# Patient Record
Sex: FEMALE | Race: WHITE | NOT HISPANIC OR LATINO | Employment: OTHER | URBAN - NONMETROPOLITAN AREA
[De-identification: names, ages, dates, MRNs, and addresses within clinical notes are randomized per-mention and may not be internally consistent; named-entity substitution may affect disease eponyms.]

---

## 2017-01-01 ENCOUNTER — HOSPITAL ENCOUNTER (OUTPATIENT)
Dept: WOUND CARE | Facility: HOSPITAL | Age: 82
Setting detail: RECURRING SERIES
Discharge: HOME | End: 2017-01-20
Attending: THORACIC SURGERY (CARDIOTHORACIC VASCULAR SURGERY) | Admitting: THORACIC SURGERY (CARDIOTHORACIC VASCULAR SURGERY)

## 2017-01-06 ENCOUNTER — OFFICE VISIT (OUTPATIENT)
Dept: FAMILY MEDICINE CLINIC | Facility: CLINIC | Age: 82
End: 2017-01-06

## 2017-01-06 VITALS
HEART RATE: 64 BPM | OXYGEN SATURATION: 93 % | TEMPERATURE: 97.3 F | DIASTOLIC BLOOD PRESSURE: 58 MMHG | WEIGHT: 185 LBS | HEIGHT: 64 IN | BODY MASS INDEX: 31.58 KG/M2 | SYSTOLIC BLOOD PRESSURE: 116 MMHG

## 2017-01-06 DIAGNOSIS — J44.9 CHRONIC OBSTRUCTIVE PULMONARY DISEASE, UNSPECIFIED COPD TYPE (HCC): Primary | ICD-10-CM

## 2017-01-06 PROCEDURE — 99212 OFFICE O/P EST SF 10 MIN: CPT | Performed by: FAMILY MEDICINE

## 2017-01-06 PROCEDURE — 96372 THER/PROPH/DIAG INJ SC/IM: CPT | Performed by: FAMILY MEDICINE

## 2017-01-06 RX ORDER — GUAIFENESIN AND DEXTROMETHORPHAN HYDROBROMIDE 600; 30 MG/1; MG/1
1 TABLET, EXTENDED RELEASE ORAL 2 TIMES DAILY PRN
Qty: 14 TABLET | Refills: 0 | Status: SHIPPED | OUTPATIENT
Start: 2017-01-06 | End: 2017-01-12

## 2017-01-06 RX ORDER — TRIAMCINOLONE ACETONIDE 40 MG/ML
80 INJECTION, SUSPENSION INTRA-ARTICULAR; INTRAMUSCULAR ONCE
Status: COMPLETED | OUTPATIENT
Start: 2017-01-06 | End: 2017-01-06

## 2017-01-06 RX ADMIN — TRIAMCINOLONE ACETONIDE 80 MG: 40 INJECTION, SUSPENSION INTRA-ARTICULAR; INTRAMUSCULAR at 11:05

## 2017-01-06 NOTE — MR AVS SNAPSHOT
Gavi Gordon   1/6/2017 10:15 AM   Office Visit    Dept Phone:  [phone]   Encounter #:  [id number]    Provider:  May Martin MD   Department:  [facility]                Your Full Care Plan              Today's Medication Changes          These changes are accurate as of: 1/6/17 11:35 AM.  If you have any questions, ask your nurse or doctor.               New Medication(s)Ordered:     guaifenesin-dextromethorphan  MG tablet sustained-release 12 hour tablet   Take 1 tablet by mouth 2 (Two) Times a Day As Needed (cough, congestion).   Started by:  May Martin MD            Where to Get Your Medications      These medications were sent to [facility] - [place], [place] - [address] [place] [address] - [phone]  - [phone] [address] [address]     Phone:  [phone]     guaifenesin-dextromethorphan  MG tablet sustained-release 12 hour tablet                  Your Updated Medication List          This list is accurate as of: 1/6/17 11:35 AM.  Always use your most recent med list.                * albuterol (2.5 MG/3ML) 0.083% nebulizer solution   Commonly known as:  PROVENTIL       * PROAIR RESPICLICK 108 (90 BASE) MCG/ACT inhaler   Generic drug:  albuterol       alendronate 70 MG tablet   Commonly known as:  FOSAMAX   Take 1 tablet by mouth 1 (One) Time Per Week.       atorvastatin 80 MG tablet   Commonly known as:  LIPITOR       bacitracin 500 UNIT/GM ointment   Apply  topically 2 (Two) Times a Day.       busPIRone 10 MG tablet   Commonly known as:  BUSPAR   TAKE 1 TABLET TWICE A DAY AS NEEDED       Calcium Carb-Cholecalciferol 600-800 MG-UNIT tablet       celecoxib 200 MG capsule   Commonly known as:  CeleBREX       CENTRUM PO       cetirizine 10 MG tablet   Commonly known as:  zyrTEC       clopidogrel 75 MG tablet   Commonly known as:  PLAVIX       diphenoxylate-atropine  2.5-0.025 MG per tablet   Commonly known as:  LOMOTIL   1 TABLET BY MOUTH 2 TIMES A DAY       donepezil 10 MG tablet   Commonly known as:  ARICEPT       fluticasone-salmeterol 500-50 MCG/DOSE DISKUS   Commonly known as:  ADVAIR       furosemide 40 MG tablet   Commonly known as:  LASIX       guaifenesin-dextromethorphan  MG tablet sustained-release 12 hour tablet   Take 1 tablet by mouth 2 (Two) Times a Day As Needed (cough, congestion).       isosorbide mononitrate 60 MG 24 hr tablet   Commonly known as:  IMDUR       levothyroxine 50 MCG tablet   Commonly known as:  SYNTHROID, LEVOTHROID       LORazepam 0.5 MG tablet   Commonly known as:  ATIVAN   Take 1 tablet by mouth Every 8 (Eight) Hours As Needed for anxiety.       memantine 28 MG capsule sustained-release 24 hr extended release capsule   Commonly known as:  NAMENDA XR   Take 1 capsule by mouth daily.       nystatin 051729 UNIT/ML suspension   Commonly known as:  MYCOSTATIN       OLUX 0.05 % topical foam   Generic drug:  clobetasol       omeprazole 20 MG capsule   Commonly known as:  priLOSEC   Take 1 capsule by mouth 2 (Two) Times a Day.       potassium chloride 10 MEQ CR tablet   Commonly known as:  K-DUR       spironolactone 50 MG tablet   Commonly known as:  ALDACTONE       triamcinolone 0.1 % cream   Commonly known as:  KENALOG       venlafaxine XR 75 MG 24 hr capsule   Commonly known as:  EFFEXOR-XR       VITAMIN B-12 ER PO       * Notice:  This list has 2 medication(s) that are the same as other medications prescribed for you. Read the directions carefully, and ask your doctor or other care provider to review them with you.            You Were Diagnosed With        Codes Comments    Chronic obstructive pulmonary disease, unspecified COPD type    -  Primary ICD-10-CM: J44.9  ICD-9-CM: 496       Medications to be Given to You by a Medical Professional     Due       Frequency    9/7/2016 ipratropium-albuterol (DUO-NEB) nebulizer solution 3 mL  4 Times  "Daily - RT    (none) triamcinolone acetonide (KENALOG-40) injection 80 mg  Once      Instructions     None    Patient Instructions History      Upcoming Appointments     Visit Type Date Time Department    OFFICE VISIT 1/6/2017 10:15 AM [facility]      MyChart Signup     Our records indicate that you have declined [email or web address] signup. If you would like to sign up for [employer], please email [email or web address] or call [phone] to obtain an activation code.             Other Info from Your Visit           Allergies     Ceclor [Cefaclor]        Reason for Visit     Nasal Congestion cough, sneezing, started last week      Vital Signs     Blood Pressure Pulse Temperature Height Weight Oxygen Saturation    116/58 64 97.3 °F (36.3 °C) 64\" (162.6 cm) 185 lb (83.9 kg) 93%    Body Mass Index Smoking Status                31.76 kg/m2 Former Smoker          Problems and Diagnoses Noted     Chronic airway obstruction    -  Primary      Medications Administered     triamcinolone acetonide (KENALOG-40) injection 80 mg                      "

## 2017-01-06 NOTE — PROGRESS NOTES
Subjective   Chief Complaint   Patient presents with   • Nasal Congestion     cough, sneezing, started last week     Gavi Gordon is a 82 y.o. female.   Nasal Congestion (cough, sneezing, started last week)    Sinusitis   This is a new problem. The current episode started 1 to 4 weeks ago. The problem has been gradually worsening since onset. There has been no fever. Her pain is at a severity of 3/10. The pain is mild. Associated symptoms include congestion, ear pain, headaches, shortness of breath, sneezing and a sore throat. Pertinent negatives include no chills, coughing, neck pain or sinus pressure. Past treatments include acetaminophen. The treatment provided mild relief.   history of copd - uses home oxygen at 2 LPM    Recent death in the family - her sister passed away  She is tearful in the office  Her  was a week ago, she passed away sudden from sepsis related to obstructed nephrolithiasis  Patient states that she is doing ok with the death and has great family support     The following portions of the patient's history were reviewed and updated as appropriate: allergies, current medications, past family history, past medical history, past social history, past surgical history and problem list.    Review of Systems   Constitutional: Negative for appetite change, chills, fatigue and fever.   HENT: Positive for congestion, ear pain, sneezing and sore throat. Negative for rhinorrhea and sinus pressure.    Eyes: Negative for pain.   Respiratory: Positive for shortness of breath. Negative for cough.    Cardiovascular: Negative for chest pain and palpitations.   Gastrointestinal: Negative for abdominal pain, constipation, diarrhea and nausea.   Genitourinary: Negative for dysuria.   Musculoskeletal: Negative for back pain, joint swelling and neck pain.   Skin: Negative for rash.   Neurological: Positive for headaches. Negative for dizziness.       Objective   Visit Vitals   • /58   • Pulse 64   •  "Temp 97.3 °F (36.3 °C)   • Ht 64\" (162.6 cm)   • Wt 185 lb (83.9 kg)   • SpO2 93%  Comment: sitting, room air   • BMI 31.76 kg/m2     Physical Exam   Constitutional: She is oriented to person, place, and time. She appears well-developed and well-nourished.   HENT:   Head: Normocephalic and atraumatic.   Right Ear: External ear normal.   Left Ear: External ear normal.   Nose: Nose normal.   Posterior rhinorrhea noted on examination   Eyes: Pupils are equal, round, and reactive to light.   Neck: Normal range of motion. Neck supple.   Cardiovascular: Normal rate, regular rhythm and normal heart sounds.    Pulmonary/Chest: No respiratory distress. She has decreased breath sounds. She has no wheezes. She has no rales.   Patient is not with portable oxygen   Abdominal: Soft. Bowel sounds are normal.   Musculoskeletal: Normal range of motion.   Ambulating with cane   Neurological: She is alert and oriented to person, place, and time.   Skin: Skin is warm and dry.   Skin tear of the right upper extremity is healing well   Tissue has healthy appearance and pink color, no signs of infection     Psychiatric: She has a normal mood and affect.   Nursing note and vitals reviewed.      Assessment/Plan   Problems Addressed this Visit        Respiratory    Chronic obstructive pulmonary disease - Primary    Relevant Medications    triamcinolone acetonide (KENALOG-40) injection 80 mg (Completed)    guaifenesin-dextromethorphan (MUCINEX DM)  MG tablet sustained-release 12 hour tablet        Start mucinex Dm - for cough and congestion    Kenalog 80mg IM today    Recommend oxygen use at home  Less activity   If symptoms worsen call the office, consider abx treatment if necessary    Recheck as needed           "

## 2017-01-10 RX ORDER — LORAZEPAM 0.5 MG/1
0.5 TABLET ORAL EVERY 8 HOURS PRN
Qty: 90 TABLET | Refills: 2 | Status: SHIPPED | OUTPATIENT
Start: 2017-01-10 | End: 2017-02-24 | Stop reason: SDUPTHER

## 2017-01-12 ENCOUNTER — OFFICE VISIT (OUTPATIENT)
Dept: FAMILY MEDICINE CLINIC | Facility: CLINIC | Age: 82
End: 2017-01-12

## 2017-01-12 VITALS
HEART RATE: 69 BPM | BODY MASS INDEX: 33.29 KG/M2 | DIASTOLIC BLOOD PRESSURE: 60 MMHG | TEMPERATURE: 97.9 F | HEIGHT: 64 IN | SYSTOLIC BLOOD PRESSURE: 122 MMHG | WEIGHT: 195 LBS

## 2017-01-12 DIAGNOSIS — J01.20 ACUTE NON-RECURRENT ETHMOIDAL SINUSITIS: ICD-10-CM

## 2017-01-12 DIAGNOSIS — J44.9 CHRONIC OBSTRUCTIVE PULMONARY DISEASE, UNSPECIFIED COPD TYPE (HCC): Primary | ICD-10-CM

## 2017-01-12 PROCEDURE — 99212 OFFICE O/P EST SF 10 MIN: CPT | Performed by: FAMILY MEDICINE

## 2017-01-12 RX ORDER — LEVOFLOXACIN 500 MG/1
500 TABLET, FILM COATED ORAL DAILY
Qty: 10 TABLET | Refills: 0 | Status: SHIPPED | OUTPATIENT
Start: 2017-01-12 | End: 2017-01-22

## 2017-01-12 RX ORDER — FLUTICASONE PROPIONATE 50 MCG
2 SPRAY, SUSPENSION (ML) NASAL DAILY
Qty: 1 EACH | Refills: 0 | Status: SHIPPED | OUTPATIENT
Start: 2017-01-12 | End: 2017-01-12 | Stop reason: SDUPTHER

## 2017-01-12 RX ORDER — FLUTICASONE PROPIONATE 50 MCG
2 SPRAY, SUSPENSION (ML) NASAL DAILY
Qty: 1 EACH | Refills: 0 | Status: SHIPPED | OUTPATIENT
Start: 2017-01-12 | End: 2017-03-16 | Stop reason: SDUPTHER

## 2017-01-12 RX ORDER — LEVOFLOXACIN 500 MG/1
500 TABLET, FILM COATED ORAL DAILY
Qty: 10 TABLET | Refills: 0 | Status: SHIPPED | OUTPATIENT
Start: 2017-01-12 | End: 2017-01-12 | Stop reason: SDUPTHER

## 2017-01-12 NOTE — PROGRESS NOTES
"Subjective   Chief Complaint   Patient presents with   • Illness     not any better     Gavi Gordon is a 82 y.o. female.   Illness (not any better)    History of Present Illness     Sinusitis   This is a new problem. The current episode started 1 to 4 weeks ago. The problem has been gradually worsening since onset. There has been no fever. Her pain is at a severity of 3/10. The pain is mild. Associated symptoms include congestion, ear pain, headaches, shortness of breath, sneezing, sinus pressure, neck and shoulder pain, and a sore throat. Pertinent negatives include no chills, coughing. Past treatments include acetaminophen. The treatment provided mild relief.   Recently failed improvement with mucinex DM and steroid injection  history of copd - uses home oxygen at 2 LPM    The following portions of the patient's history were reviewed and updated as appropriate: allergies, current medications, past family history, past medical history, past social history, past surgical history and problem list.    Review of Systems   Constitutional: Negative for appetite change, chills, fatigue and fever.   HENT: Positive for rhinorrhea, sinus pressure, sneezing and sore throat. Negative for congestion and ear pain.    Eyes: Negative for pain.   Respiratory: Positive for shortness of breath. Negative for cough.    Cardiovascular: Negative for chest pain and palpitations.   Gastrointestinal: Negative for abdominal pain, constipation, diarrhea and nausea.   Genitourinary: Negative for dysuria.   Musculoskeletal: Positive for arthralgias, myalgias and neck pain. Negative for back pain and joint swelling.   Skin: Negative for rash.   Neurological: Positive for headaches. Negative for dizziness.       Objective   Visit Vitals   • /60   • Pulse 69   • Temp 97.9 °F (36.6 °C)   • Ht 64\" (162.6 cm)   • Wt 195 lb (88.5 kg)   • BMI 33.47 kg/m2     Physical Exam   Constitutional: She is oriented to person, place, and time. She " appears well-developed and well-nourished.   HENT:   Head: Normocephalic and atraumatic.   Right Ear: External ear normal.   Left Ear: External ear normal.   Nose: Sinus tenderness present. Right sinus exhibits maxillary sinus tenderness. Right sinus exhibits no frontal sinus tenderness. Left sinus exhibits maxillary sinus tenderness. Left sinus exhibits no frontal sinus tenderness.   Mouth/Throat: Oropharynx is clear and moist.   Ethmoidal sinus tenderness   Eyes: Pupils are equal, round, and reactive to light.   Neck: Normal range of motion. Neck supple.   Cardiovascular: Normal rate, regular rhythm and normal heart sounds.    Pulmonary/Chest: Breath sounds normal. No respiratory distress. She has no wheezes. She has no rales.   Neurological: She is alert and oriented to person, place, and time.   Skin: Skin is warm and dry.   Psychiatric: She has a normal mood and affect.   Nursing note and vitals reviewed.      Assessment/Plan   Problems Addressed this Visit        Respiratory    Chronic obstructive pulmonary disease - Primary    Relevant Medications    levoFLOXacin (LEVAQUIN) 500 MG tablet    fluticasone (FLONASE) 50 MCG/ACT nasal spray      Other Visit Diagnoses     Acute non-recurrent ethmoidal sinusitis        Relevant Medications    fluticasone (FLONASE) 50 MCG/ACT nasal spray        Failed steroid injection and mucinex DM  Start levaquin  Start flonase  Recheck as needed

## 2017-01-12 NOTE — MR AVS SNAPSHOT
Gavi Gordon   1/12/2017 11:45 AM   Office Visit    Dept Phone:  [phone]   Encounter #:  [id number]    Provider:  May Martin MD   Department:  [facility]                Your Full Care Plan              Today's Medication Changes          These changes are accurate as of: 1/12/17 12:16 PM.  If you have any questions, ask your nurse or doctor.               New Medication(s)Ordered:     fluticasone 50 MCG/ACT nasal spray   Commonly known as:  FLONASE   2 sprays into each nostril Daily.   Started by:  May Martin MD       levoFLOXacin 500 MG tablet   Commonly known as:  LEVAQUIN   Take 1 tablet by mouth Daily for 10 days.   Started by:  May Martin MD         Stop taking medication(s)listed here:     guaifenesin-dextromethorphan  MG tablet sustained-release 12 hour tablet   Stopped by:  May Martin MD                Where to Get Your Medications      These medications were sent to [facility] [place] [id number] - [phone] [place] [phone] [facility] [facility] [address]     Phone:  [phone]     fluticasone 50 MCG/ACT nasal spray    levoFLOXacin 500 MG tablet                  Your Updated Medication List          This list is accurate as of: 1/12/17 12:16 PM.  Always use your most recent med list.                * albuterol (2.5 MG/3ML) 0.083% nebulizer solution   Commonly known as:  PROVENTIL       * PROAIR RESPICLICK 108 (90 BASE) MCG/ACT inhaler   Generic drug:  albuterol       alendronate 70 MG tablet   Commonly known as:  FOSAMAX   Take 1 tablet by mouth 1 (One) Time Per Week.       atorvastatin 80 MG tablet   Commonly known as:  LIPITOR       bacitracin 500 UNIT/GM ointment   Apply  topically 2 (Two) Times a Day.       busPIRone 10 MG tablet   Commonly known as:  BUSPAR   TAKE 1 TABLET TWICE A DAY AS NEEDED       Calcium Carb-Cholecalciferol 600-800  MG-UNIT tablet       celecoxib 200 MG capsule   Commonly known as:  CeleBREX       CENTRUM PO       cetirizine 10 MG tablet   Commonly known as:  zyrTEC       clopidogrel 75 MG tablet   Commonly known as:  PLAVIX       diphenoxylate-atropine 2.5-0.025 MG per tablet   Commonly known as:  LOMOTIL   1 TABLET BY MOUTH 2 TIMES A DAY       donepezil 10 MG tablet   Commonly known as:  ARICEPT       fluticasone 50 MCG/ACT nasal spray   Commonly known as:  FLONASE   2 sprays into each nostril Daily.       fluticasone-salmeterol 500-50 MCG/DOSE DISKUS   Commonly known as:  ADVAIR       furosemide 40 MG tablet   Commonly known as:  LASIX       isosorbide mononitrate 60 MG 24 hr tablet   Commonly known as:  IMDUR       levoFLOXacin 500 MG tablet   Commonly known as:  LEVAQUIN   Take 1 tablet by mouth Daily for 10 days.       levothyroxine 50 MCG tablet   Commonly known as:  SYNTHROID, LEVOTHROID       LORazepam 0.5 MG tablet   Commonly known as:  ATIVAN   Take 1 tablet by mouth Every 8 (Eight) Hours As Needed for anxiety.       memantine 28 MG capsule sustained-release 24 hr extended release capsule   Commonly known as:  NAMENDA XR   Take 1 capsule by mouth daily.       nystatin 150006 UNIT/ML suspension   Commonly known as:  MYCOSTATIN       OLUX 0.05 % topical foam   Generic drug:  clobetasol       omeprazole 20 MG capsule   Commonly known as:  priLOSEC   Take 1 capsule by mouth 2 (Two) Times a Day.       potassium chloride 10 MEQ CR tablet   Commonly known as:  K-DUR       spironolactone 50 MG tablet   Commonly known as:  ALDACTONE       triamcinolone 0.1 % cream   Commonly known as:  KENALOG       venlafaxine XR 75 MG 24 hr capsule   Commonly known as:  EFFEXOR-XR       VITAMIN B-12 ER PO       * Notice:  This list has 2 medication(s) that are the same as other medications prescribed for you. Read the directions carefully, and ask your doctor or other care provider to review them with you.            You Were Diagnosed With  "       Codes Comments    Chronic obstructive pulmonary disease, unspecified COPD type    -  Primary ICD-10-CM: J44.9  ICD-9-CM: 496     Acute non-recurrent ethmoidal sinusitis     ICD-10-CM: J01.20  ICD-9-CM: 461.2       Medications to be Given to You by a Medical Professional     Due       Frequency    9/7/2016 ipratropium-albuterol (DUO-NEB) nebulizer solution 3 mL  4 Times Daily - RT      Instructions     None    Patient Instructions History      Upcoming Appointments     Visit Type Date Time Department    OFFICE VISIT 1/12/2017 11:45 AM [facility]      [employer] Signup     Our records indicate that your [employer] account has been deactivated. If you would like to reactivate your account, please email [email or web address] or call [phone] to talk to our [employer] staff.             Other Info from Your Visit           Allergies     Ceclor [Cefaclor]        Reason for Visit     Illness not any better      Vital Signs     Blood Pressure Pulse Temperature Height Weight Body Mass Index    122/60 69 97.9 °F (36.6 °C) 64\" (162.6 cm) 195 lb (88.5 kg) 33.47 kg/m2    Smoking Status                   Former Smoker           Problems and Diagnoses Noted     Chronic airway obstruction    Acute non-recurrent ethmoidal sinusitis          No Longer an Issue     Infected skin tear    Skin tear of forearm without complication        "

## 2017-01-23 ENCOUNTER — LAB (OUTPATIENT)
Dept: LAB | Facility: OTHER | Age: 82
End: 2017-01-23

## 2017-01-23 ENCOUNTER — OFFICE VISIT (OUTPATIENT)
Dept: FAMILY MEDICINE CLINIC | Facility: CLINIC | Age: 82
End: 2017-01-23

## 2017-01-23 VITALS
DIASTOLIC BLOOD PRESSURE: 78 MMHG | WEIGHT: 183 LBS | SYSTOLIC BLOOD PRESSURE: 128 MMHG | BODY MASS INDEX: 31.24 KG/M2 | TEMPERATURE: 96.6 F | HEIGHT: 64 IN | HEART RATE: 79 BPM

## 2017-01-23 DIAGNOSIS — M43.10 ANTEROLISTHESIS: ICD-10-CM

## 2017-01-23 DIAGNOSIS — M79.10 MYALGIA: ICD-10-CM

## 2017-01-23 DIAGNOSIS — B37.0 ORAL THRUSH: Primary | ICD-10-CM

## 2017-01-23 DIAGNOSIS — E53.8 B12 DEFICIENCY: ICD-10-CM

## 2017-01-23 LAB
ANION GAP SERPL CALCULATED.3IONS-SCNC: 8 MMOL/L (ref 5–15)
BUN BLD-MCNC: 26 MG/DL (ref 8–25)
BUN/CREAT SERPL: 28.9 (ref 7–25)
CALCIUM SPEC-SCNC: 9.3 MG/DL (ref 8.4–10.8)
CHLORIDE SERPL-SCNC: 98 MMOL/L (ref 100–112)
CO2 SERPL-SCNC: 34 MMOL/L (ref 20–32)
CREAT BLD-MCNC: 0.9 MG/DL (ref 0.4–1.3)
DEPRECATED RDW RBC AUTO: 53.2 FL (ref 36.4–46.3)
EOSINOPHIL # BLD MANUAL: 0.66 10*3/MM3 (ref 0–0.7)
EOSINOPHIL NFR BLD MANUAL: 7 % (ref 0–7)
ERYTHROCYTE [DISTWIDTH] IN BLOOD BY AUTOMATED COUNT: 15 % (ref 11.5–14.5)
GFR SERPL CREATININE-BSD FRML MDRD: 60 ML/MIN/1.73 (ref 39–90)
GLUCOSE BLD-MCNC: 103 MG/DL (ref 70–100)
HCT VFR BLD AUTO: 40.1 % (ref 35–45)
HGB BLD-MCNC: 12.6 G/DL (ref 12–15.5)
LYMPHOCYTES # BLD MANUAL: 1.79 10*3/MM3 (ref 0.6–4.2)
LYMPHOCYTES NFR BLD MANUAL: 10 % (ref 0–12)
LYMPHOCYTES NFR BLD MANUAL: 19 % (ref 10–50)
MAGNESIUM SERPL-MCNC: 2.1 MG/DL (ref 1.8–2.5)
MCH RBC QN AUTO: 31.1 PG (ref 26.5–34)
MCHC RBC AUTO-ENTMCNC: 31.4 G/DL (ref 31.4–36)
MCV RBC AUTO: 99 FL (ref 80–98)
MONOCYTES # BLD AUTO: 0.94 10*3/MM3 (ref 0–0.9)
NEUTROPHILS # BLD AUTO: 6.04 10*3/MM3 (ref 2–8.6)
NEUTROPHILS NFR BLD MANUAL: 64 % (ref 37–80)
PLAT MORPH BLD: NORMAL
PLATELET # BLD AUTO: 215 10*3/MM3 (ref 150–450)
PMV BLD AUTO: 9.6 FL (ref 8–12)
POTASSIUM BLD-SCNC: 4.4 MMOL/L (ref 3.4–5.4)
RBC # BLD AUTO: 4.05 10*6/MM3 (ref 3.77–5.16)
RBC MORPH BLD: NORMAL
SODIUM BLD-SCNC: 140 MMOL/L (ref 134–146)
WBC MORPH BLD: NORMAL
WBC NRBC COR # BLD: 9.44 10*3/MM3 (ref 3.2–9.8)

## 2017-01-23 PROCEDURE — 80048 BASIC METABOLIC PNL TOTAL CA: CPT | Performed by: FAMILY MEDICINE

## 2017-01-23 PROCEDURE — 82607 VITAMIN B-12: CPT | Performed by: FAMILY MEDICINE

## 2017-01-23 PROCEDURE — 82746 ASSAY OF FOLIC ACID SERUM: CPT | Performed by: FAMILY MEDICINE

## 2017-01-23 PROCEDURE — 83735 ASSAY OF MAGNESIUM: CPT | Performed by: FAMILY MEDICINE

## 2017-01-23 PROCEDURE — 85025 COMPLETE CBC W/AUTO DIFF WBC: CPT | Performed by: FAMILY MEDICINE

## 2017-01-23 PROCEDURE — 99213 OFFICE O/P EST LOW 20 MIN: CPT | Performed by: FAMILY MEDICINE

## 2017-01-23 NOTE — MR AVS SNAPSHOT
Gavi Gordon   1/23/2017 2:00 PM   Office Visit    Dept Phone:  [phone]   Encounter #:  [id number]    Provider:  May Martin MD   Department:  [facility]                Your Full Care Plan              Today's Medication Changes          These changes are accurate as of: 1/23/17  3:15 PM.  If you have any questions, ask your nurse or doctor.               Stop taking medication(s)listed here:     bacitracin 500 UNIT/GM ointment   Stopped by:  May Martin MD                Where to Get Your Medications      These medications were sent to [facility] - [place], [place] - [address] [place] [address] - [phone]  - [phone] [address] [address]     Phone:  [phone]     nystatin 576354 UNIT/ML suspension                  Your Updated Medication List          This list is accurate as of: 1/23/17  3:15 PM.  Always use your most recent med list.                * albuterol (2.5 MG/3ML) 0.083% nebulizer solution   Commonly known as:  PROVENTIL       * PROAIR RESPICLICK 108 (90 BASE) MCG/ACT inhaler   Generic drug:  albuterol       alendronate 70 MG tablet   Commonly known as:  FOSAMAX   Take 1 tablet by mouth 1 (One) Time Per Week.       atorvastatin 80 MG tablet   Commonly known as:  LIPITOR       busPIRone 10 MG tablet   Commonly known as:  BUSPAR   TAKE 1 TABLET TWICE A DAY AS NEEDED       Calcium Carb-Cholecalciferol 600-800 MG-UNIT tablet       celecoxib 200 MG capsule   Commonly known as:  CeleBREX       CENTRUM PO       cetirizine 10 MG tablet   Commonly known as:  zyrTEC       clopidogrel 75 MG tablet   Commonly known as:  PLAVIX       diphenoxylate-atropine 2.5-0.025 MG per tablet   Commonly known as:  LOMOTIL   1 TABLET BY MOUTH 2 TIMES A DAY       donepezil 10 MG tablet   Commonly known as:  ARICEPT       fluticasone 50 MCG/ACT nasal spray   Commonly known as:  FLONASE   2 sprays  into each nostril Daily.       fluticasone-salmeterol 500-50 MCG/DOSE DISKUS   Commonly known as:  ADVAIR       furosemide 40 MG tablet   Commonly known as:  LASIX       isosorbide mononitrate 60 MG 24 hr tablet   Commonly known as:  IMDUR       levothyroxine 50 MCG tablet   Commonly known as:  SYNTHROID, LEVOTHROID       LORazepam 0.5 MG tablet   Commonly known as:  ATIVAN   Take 1 tablet by mouth Every 8 (Eight) Hours As Needed for anxiety.       memantine 28 MG capsule sustained-release 24 hr extended release capsule   Commonly known as:  NAMENDA XR   Take 1 capsule by mouth daily.       nystatin [id number] UNIT/ML suspension   Commonly known as:  MYCOSTATIN   Swish and swallow 5 mL 3 (Three) Times a Day.       OLUX 0.05 % topical foam   Generic drug:  clobetasol       omeprazole 20 MG capsule   Commonly known as:  priLOSEC   Take 1 capsule by mouth 2 (Two) Times a Day.       potassium chloride 10 MEQ CR tablet   Commonly known as:  K-DUR       spironolactone 50 MG tablet   Commonly known as:  ALDACTONE       triamcinolone 0.1 % cream   Commonly known as:  KENALOG       venlafaxine XR 75 MG 24 hr capsule   Commonly known as:  EFFEXOR-XR       VITAMIN B-12 ER PO       * Notice:  This list has 2 medication(s) that are the same as other medications prescribed for you. Read the directions carefully, and ask your doctor or other care provider to review them with you.            We Performed the Following     CBC & Differential     Magnesium     Vitamin B12 & Folate       You Were Diagnosed With        Codes Comments    Oral thrush    -  Primary ICD-10-CM: B37.0  ICD-9-CM: 112.0     B12 deficiency     ICD-10-CM: E53.8  ICD-9-CM: 266.2     Myalgia     ICD-10-CM: M79.1  ICD-9-CM: 729.1       Medications to be Given to You by a Medical Professional     Due       Frequency    9/7/2016 ipratropium-albuterol (DUO-NEB) nebulizer solution 3 mL  4 Times Daily - RT      Instructions     None    Patient Instructions History     "  Upcoming Appointments     Visit Type Date Time Department    OFFICE VISIT 1/23/2017  2:00 PM [facility]      MyChart Signup     Our records indicate that you have declined [facility] MyChart signup. If you would like to sign up for [employer], please email [facility] [email or web address] or call [phone] to obtain an activation code.             Other Info from Your Visit           Allergies     Ceclor [Cefaclor]        Reason for Visit     Generalized Body Aches neck, shoulders and back. also her mouth feels like its burning      Vital Signs     Blood Pressure Pulse Temperature Height Weight       128/78 79 96.6 °F (35.9 °C) 64\" (162.6 cm) 183 lb (83 kg)     Body Mass Index Smoking Status                31.41 kg/m2 Former Smoker          Problems and Diagnoses Noted     Oral thrush    -  Primary    Vitamin B12 deficiency        Myalgia            "

## 2017-01-23 NOTE — PROGRESS NOTES
"Subjective   Chief Complaint   Patient presents with   • Generalized Body Aches     neck, shoulders and back. also her mouth feels like its burning     Gavi Gordon is a 82 y.o. female.   Generalized Body Aches (neck, shoulders and back. also her mouth feels like its burning)    History of Present Illness     Sudden onset of burning of the tongue   Episode lasted while eating - a few minutes  Worsened by ice cream and cheese with cracker  Currently using b12 sublingual supplement  Previously was prescribed oral supplement (taking 2 per day)    C/o rhinorrhea, body aches    C/o neuropathy of the left upper extremity  Worsening by movement  Cervical MRI done in November  MRI indicated - very mild central stenosis at C4/C5  Multilevel neural - foraminal stenosis which is most marked on the left at C2/3 and C4/5 secondary to uncovertebral spurring at these levels, anteroslisthesis at C4/C5 measuring 3.7mm    The following portions of the patient's history were reviewed and updated as appropriate: allergies, current medications, past family history, past medical history, past social history, past surgical history and problem list.    Review of Systems   HENT: Positive for congestion, rhinorrhea and sore throat.    Musculoskeletal: Positive for arthralgias, myalgias and neck pain.   Neurological: Positive for numbness.       Objective   Visit Vitals   • /78   • Pulse 79   • Temp 96.6 °F (35.9 °C)   • Ht 64\" (162.6 cm)   • Wt 183 lb (83 kg)   • LMP Comment: hysterectomy   • BMI 31.41 kg/m2     Physical Exam   Constitutional: She is oriented to person, place, and time. She appears well-developed and well-nourished.   HENT:   Head: Normocephalic and atraumatic.   Right Ear: External ear normal.   Left Ear: External ear normal.   Nose: Right sinus exhibits no maxillary sinus tenderness and no frontal sinus tenderness. Left sinus exhibits no maxillary sinus tenderness and no frontal sinus tenderness.   Mouth/Throat: " Oropharynx is clear and moist. Mucous membranes are pale and dry. No oropharyngeal exudate, posterior oropharyngeal edema or posterior oropharyngeal erythema.   Eyes: Pupils are equal, round, and reactive to light.   Neck: Neck supple. Decreased range of motion present.   Neuropathy produced with flexion of the cervical spine towards the right side noted in the left hand   Cardiovascular: Normal rate, regular rhythm and normal heart sounds.    Pulmonary/Chest: Effort normal and breath sounds normal. No respiratory distress. She has no wheezes. She has no rales.   Neurological: She is alert and oriented to person, place, and time.   Skin: Skin is warm and dry.   Psychiatric: She has a normal mood and affect.   Nursing note and vitals reviewed.      Assessment/Plan   Problems Addressed this Visit     None      Visit Diagnoses     Oral thrush    -  Primary    Relevant Medications    nystatin (MYCOSTATIN) 167607 UNIT/ML suspension    B12 deficiency        Relevant Orders    Vitamin B12 & Folate    CBC & Differential    Myalgia        Relevant Orders    Magnesium    Basic Metabolic Panel    Anterolisthesis        Relevant Orders    Ambulatory Referral to Orthopedic Surgery        Lab work ordered    Start nystatin mouth wash    Contacted [place] and requested MRI report  Reviewed results with patient  Referral to Dr Orellana agreed and placed    Recheck as needed

## 2017-01-24 ENCOUNTER — TELEPHONE (OUTPATIENT)
Dept: FAMILY MEDICINE CLINIC | Facility: CLINIC | Age: 82
End: 2017-01-24

## 2017-01-24 DIAGNOSIS — B37.0 ORAL THRUSH: ICD-10-CM

## 2017-01-24 LAB
FOLATE SERPL-MCNC: >20 NG/ML (ref 2.76–21)
VIT B12 BLD-MCNC: 985 PG/ML (ref 239–931)

## 2017-01-24 RX ORDER — LANOLIN ALCOHOL/MO/W.PET/CERES
1000 CREAM (GRAM) TOPICAL DAILY
Qty: 90 TABLET | Refills: 3 | Status: SHIPPED | OUTPATIENT
Start: 2017-01-24 | End: 2017-01-25 | Stop reason: SDUPTHER

## 2017-01-24 NOTE — TELEPHONE ENCOUNTER
----- Message from May Martin MD sent at 1/24/2017 12:58 PM CST -----  b12 high.  Sent in a new prescription for b12 to pharmacy.  Stop sublingual medication.

## 2017-01-25 DIAGNOSIS — B37.0 ORAL THRUSH: ICD-10-CM

## 2017-01-25 RX ORDER — DONEPEZIL HYDROCHLORIDE 10 MG/1
10 TABLET, FILM COATED ORAL NIGHTLY
Qty: 30 TABLET | Refills: 3 | Status: SHIPPED | OUTPATIENT
Start: 2017-01-25 | End: 2017-04-25 | Stop reason: SDUPTHER

## 2017-01-25 RX ORDER — VENLAFAXINE HYDROCHLORIDE 75 MG/1
75 CAPSULE, EXTENDED RELEASE ORAL DAILY
Qty: 60 CAPSULE | Refills: 3 | Status: SHIPPED | OUTPATIENT
Start: 2017-01-25 | End: 2017-05-10 | Stop reason: SDUPTHER

## 2017-01-25 RX ORDER — LANOLIN ALCOHOL/MO/W.PET/CERES
1000 CREAM (GRAM) TOPICAL DAILY
Qty: 90 TABLET | Refills: 3 | Status: SHIPPED | OUTPATIENT
Start: 2017-01-25 | End: 2017-02-02

## 2017-01-25 RX ORDER — LEVOTHYROXINE SODIUM 0.05 MG/1
50 TABLET ORAL DAILY
Qty: 30 TABLET | Refills: 3 | Status: SHIPPED | OUTPATIENT
Start: 2017-01-25 | End: 2017-07-26 | Stop reason: SDUPTHER

## 2017-01-25 RX ORDER — SPIRONOLACTONE 50 MG/1
50 TABLET, FILM COATED ORAL DAILY
Qty: 30 TABLET | Refills: 3 | Status: SHIPPED | OUTPATIENT
Start: 2017-01-25 | End: 2017-04-25 | Stop reason: SDUPTHER

## 2017-02-02 DIAGNOSIS — E53.8 B12 DEFICIENCY: Primary | ICD-10-CM

## 2017-02-02 RX ORDER — CYANOCOBALAMIN 1000 UG/ML
1000 INJECTION, SOLUTION INTRAMUSCULAR; SUBCUTANEOUS
Status: SHIPPED | OUTPATIENT
Start: 2017-02-02

## 2017-02-06 ENCOUNTER — TELEPHONE (OUTPATIENT)
Dept: FAMILY MEDICINE CLINIC | Facility: CLINIC | Age: 82
End: 2017-02-06

## 2017-02-06 ENCOUNTER — OFFICE VISIT (OUTPATIENT)
Dept: FAMILY MEDICINE CLINIC | Facility: CLINIC | Age: 82
End: 2017-02-06

## 2017-02-06 VITALS
BODY MASS INDEX: 31.92 KG/M2 | WEIGHT: 187 LBS | OXYGEN SATURATION: 82 % | SYSTOLIC BLOOD PRESSURE: 138 MMHG | HEIGHT: 64 IN | TEMPERATURE: 99.7 F | DIASTOLIC BLOOD PRESSURE: 60 MMHG | HEART RATE: 75 BPM

## 2017-02-06 DIAGNOSIS — J10.1 INFLUENZA A: Primary | ICD-10-CM

## 2017-02-06 DIAGNOSIS — J44.9 CHRONIC OBSTRUCTIVE PULMONARY DISEASE, UNSPECIFIED COPD TYPE (HCC): ICD-10-CM

## 2017-02-06 DIAGNOSIS — J11.1 INFLUENZA-LIKE ILLNESS: ICD-10-CM

## 2017-02-06 LAB
FLUAV AG NPH QL: POSITIVE
FLUBV AG NPH QL IA: NEGATIVE

## 2017-02-06 PROCEDURE — 99213 OFFICE O/P EST LOW 20 MIN: CPT | Performed by: FAMILY MEDICINE

## 2017-02-06 PROCEDURE — 87804 INFLUENZA ASSAY W/OPTIC: CPT | Performed by: FAMILY MEDICINE

## 2017-02-06 RX ORDER — OSELTAMIVIR PHOSPHATE 75 MG/1
75 CAPSULE ORAL 2 TIMES DAILY
Qty: 10 CAPSULE | Refills: 0 | Status: SHIPPED | OUTPATIENT
Start: 2017-02-06 | End: 2017-02-11

## 2017-02-06 RX ORDER — OSELTAMIVIR PHOSPHATE 75 MG/1
75 CAPSULE ORAL 2 TIMES DAILY
Qty: 10 CAPSULE | Refills: 0 | Status: SHIPPED | OUTPATIENT
Start: 2017-02-06 | End: 2017-02-06 | Stop reason: SDUPTHER

## 2017-02-06 NOTE — TELEPHONE ENCOUNTER
----- Message from May Martin MD sent at 2/6/2017  4:25 PM CST -----  Influenza positive.  tamiflu called into pharmacy.

## 2017-02-06 NOTE — PROGRESS NOTES
"Subjective   Chief Complaint   Patient presents with   • COPD     4 week follow up     Gavi Gordon is a 82 y.o. female.   COPD (4 week follow up)    Cough   This is a new problem. The current episode started yesterday. The problem has been gradually worsening. The cough is productive of sputum. Associated symptoms include chills, a fever, myalgias, nasal congestion, postnasal drip, rhinorrhea, a sore throat and shortness of breath. Pertinent negatives include no wheezing. Nothing aggravates the symptoms. Her past medical history is significant for COPD.      Upper Extremity Issue   This is a chronic problem. The current episode started more than 1 month ago. The problem occurs intermittently. The problem has been gradually worsening. Associated symptoms include headaches and numbness. Pertinent negatives include no abdominal pain, chest pain, chills, congestion, coughing, fatigue, fever, joint swelling, nausea, neck pain, rash or sore throat. Nothing aggravates the symptoms. She has tried NSAIDs for the symptoms. The treatment provided no relief.   cervical spine xray indicated slipped disc at C4-C5 with degenerative changes    The following portions of the patient's history were reviewed and updated as appropriate: allergies, current medications, past family history, past medical history, past social history, past surgical history and problem list.    Review of Systems   Constitutional: Positive for chills and fever.   HENT: Positive for postnasal drip, rhinorrhea and sore throat.    Respiratory: Positive for cough and shortness of breath. Negative for wheezing.    Musculoskeletal: Positive for myalgias.       Objective   Visit Vitals   • /60   • Pulse 75   • Temp 99.7 °F (37.6 °C)   • Ht 64\" (162.6 cm)   • Wt 187 lb (84.8 kg)   • SpO2 (!) 82%   • BMI 32.1 kg/m2     Physical Exam   Constitutional: She is oriented to person, place, and time. She appears well-developed and well-nourished.   HENT:   Head: " Normocephalic and atraumatic.   Right Ear: External ear normal.   Left Ear: External ear normal.   Nose: Rhinorrhea present.   Mouth/Throat: Oropharynx is clear and moist.   Eyes: Pupils are equal, round, and reactive to light.   Neck: Normal range of motion. Neck supple.   Cardiovascular: Normal rate, regular rhythm and normal heart sounds.    Pulmonary/Chest: Breath sounds normal. No respiratory distress. She has no wheezes. She has no rales.   Abdominal: Soft. Bowel sounds are normal.   Musculoskeletal: Normal range of motion.   Neurological: She is alert and oriented to person, place, and time.   Skin: Skin is warm and dry.   Psychiatric: She has a normal mood and affect.   Nursing note and vitals reviewed.      Assessment/Plan   Problems Addressed this Visit        Respiratory    Chronic obstructive pulmonary disease      Other Visit Diagnoses     Influenza A    -  Primary    Influenza-like illness        Relevant Orders    XR Chest 2 View (Completed)    Influenza Antigen (Completed)        Ibuprofen given in office     Flu screen today = positive  Start tamiflu  Script sent into pharmacy  Discussed results with patient  Recommended tylenol or motrin for fever control  Push fluids    Wear oxygen at home    cxr for copd symptoms - negative    If symptoms worsen go to ER    Recheck as needed

## 2017-02-08 RX ORDER — OMEPRAZOLE 20 MG/1
CAPSULE, DELAYED RELEASE ORAL
Qty: 180 CAPSULE | Refills: 0 | Status: SHIPPED | OUTPATIENT
Start: 2017-02-08 | End: 2017-05-07 | Stop reason: SDUPTHER

## 2017-02-13 ENCOUNTER — TELEPHONE (OUTPATIENT)
Dept: FAMILY MEDICINE CLINIC | Facility: CLINIC | Age: 82
End: 2017-02-13

## 2017-02-13 NOTE — TELEPHONE ENCOUNTER
----- Message from Annelise Kauffman sent at 2/13/2017  3:00 PM CST -----  Please give patient a call. She says on her med list it says she is supposed to take her lorazepam 1 time a day. She says she takes it 3 times a day and doesn't know why it was changed. She also asked about her b12 shot. It was on her AVS and she doesn't know anything about it. She wanted me to let you know that her tamaflu was sent to her [email or web address]. She says for anything that she needs immediately needs to be sent to a regular pharmacy and then her long term medicines [facility] Her number is [phone]

## 2017-02-24 RX ORDER — CLOBETASOL PROPIONATE 0.5 MG/G
AEROSOL, FOAM TOPICAL 2 TIMES DAILY
Qty: 1 EACH | Refills: 5 | Status: SHIPPED | OUTPATIENT
Start: 2017-02-24 | End: 2017-05-12 | Stop reason: SDUPTHER

## 2017-02-24 RX ORDER — LORAZEPAM 0.5 MG/1
0.5 TABLET ORAL EVERY 8 HOURS PRN
Qty: 90 TABLET | Refills: 5 | Status: SHIPPED | OUTPATIENT
Start: 2017-02-24 | End: 2017-08-15 | Stop reason: SDUPTHER

## 2017-02-27 ENCOUNTER — OFFICE VISIT (OUTPATIENT)
Dept: ORTHOPEDIC SURGERY | Facility: CLINIC | Age: 82
End: 2017-02-27

## 2017-02-27 VITALS — HEIGHT: 64 IN | WEIGHT: 180 LBS | BODY MASS INDEX: 30.73 KG/M2

## 2017-02-27 DIAGNOSIS — M54.2 CERVICAL PAIN (NECK): ICD-10-CM

## 2017-02-27 DIAGNOSIS — M48.02 SPINAL STENOSIS OF CERVICAL REGION: Primary | ICD-10-CM

## 2017-02-27 DIAGNOSIS — M43.10 ANTEROLISTHESIS: ICD-10-CM

## 2017-02-27 PROCEDURE — 99203 OFFICE O/P NEW LOW 30 MIN: CPT | Performed by: ORTHOPAEDIC SURGERY

## 2017-02-27 RX ORDER — FUROSEMIDE 40 MG/1
TABLET ORAL
Qty: 180 TABLET | Refills: 3 | Status: SHIPPED | OUTPATIENT
Start: 2017-02-27 | End: 2018-02-23 | Stop reason: SDUPTHER

## 2017-02-27 NOTE — PROGRESS NOTES
Gavi Gordon is a 82 y.o. female   Primary provider:  May Martin MD       Chief Complaint   Patient presents with   • Cervical Spine - Establish Care     MRI 11/22/16 @ [facility]       HISTORY OF PRESENT ILLNESS:    Neck Pain    This is a chronic problem. The problem occurs constantly. The quality of the pain is described as aching. The pain is at a severity of 5/10. The pain is mild. Associated symptoms include numbness, photophobia, tingling and weakness. Pertinent negatives include no chest pain, fever, headaches or trouble swallowing. She has tried nothing for the symptoms.     Notes numbness and tingling of the left arm.  States symptoms have been somewhat constant, worse when she positions her head to the left.  States she has some neck pain as well.         CONCURRENT MEDICAL HISTORY:    Past Medical History   Diagnosis Date   • Abnormality of nail of toe    • Acute bronchitis, unspecified    • Age-asscioated Memory impairment    • Allergic rhinitis    • Anxiety state    • Backache    • Body mass index (BMI) of 30+ - obesity    • Chronic dermatitis    • Chronic obstructive lung disease    • Chronic respiratory failure with hypoxia    • Cough    • Depressive disorder    • Dermatitis    • Diarrhea    • Edema of lower extremity    • Gastritis    • Generalized anxiety disorder    • Hyperbilirubinemia    • Hyperlipidemia    • Hypothyroidism    • IBS (irritable bowel syndrome)    • Intertrigo    • Memory impairment    • Multiple acquired skin tags    • Need for prophylactic vaccination and inoculation against diphtheria-tetanus-pertussis, combined [DTP    • Needs Influenza immunization    • Osteoarthritis    • Osteopenia    • Other lesions of oral mucosa    • Pain in lower limb    • Seborrheic dermatitis, unspecified    • Seborrheic keratoses    • Shoulder joint pain    • Synovial cyst popliteal space    • Tinea unguium    • Xerosis cutis        Allergies   Allergen Reactions   • Bee Venom    • Ceclor  [Cefaclor]    • Prednisone          Current Outpatient Prescriptions:   •  albuterol (PROAIR RESPICLICK) 108 (90 BASE) MCG/ACT inhaler, Inhale 1 puff every 4 (four) hours as needed for wheezing., Disp: , Rfl:   •  albuterol (PROVENTIL) (2.5 MG/3ML) 0.083% nebulizer solution, Take 2.5 mg by nebulization 4 (four) times a day., Disp: , Rfl:   •  alendronate (FOSAMAX) 70 MG tablet, Take 1 tablet by mouth 1 (One) Time Per Week., Disp: 12 tablet, Rfl: 3  •  atorvastatin (LIPITOR) 80 MG tablet, Take 80 mg by mouth every night., Disp: , Rfl:   •  busPIRone (BUSPAR) 10 MG tablet, TAKE 1 TABLET TWICE A DAY AS NEEDED, Disp: 180 tablet, Rfl: 2  •  Calcium Carb-Cholecalciferol 600-800 MG-UNIT tablet, Take 1 tablet by mouth 2 (two) times a day., Disp: , Rfl:   •  celecoxib (CeleBREX) 200 MG capsule, Take 200 mg by mouth daily., Disp: , Rfl:   •  cetirizine (ZyrTEC) 10 MG tablet, Take 10 mg by mouth daily., Disp: , Rfl:   •  clobetasol (OLUX) 0.05 % topical foam, Apply  topically 2 (Two) Times a Day., Disp: 1 each, Rfl: 5  •  clopidogrel (PLAVIX) 75 MG tablet, Take 75 mg by mouth daily., Disp: , Rfl:   •  diphenoxylate-atropine (LOMOTIL) 2.5-0.025 MG per tablet, 1 TABLET BY MOUTH 2 TIMES A DAY, Disp: 180 tablet, Rfl: 1  •  donepezil (ARICEPT) 10 MG tablet, Take 1 tablet by mouth Every Night., Disp: 30 tablet, Rfl: 3  •  fluticasone (FLONASE) 50 MCG/ACT nasal spray, 2 sprays into each nostril Daily., Disp: 1 each, Rfl: 0  •  fluticasone-salmeterol (ADVAIR) 500-50 MCG/DOSE DISKUS, Inhale 1 puff 2 (two) times a day., Disp: , Rfl:   •  furosemide (LASIX) 40 MG tablet, TAKE 1 TABLET TWICE A DAY, Disp: 180 tablet, Rfl: 3  •  isosorbide mononitrate (IMDUR) 60 MG 24 hr tablet, Take 60 mg by mouth daily., Disp: , Rfl:   •  levothyroxine (SYNTHROID, LEVOTHROID) 50 MCG tablet, Take 1 tablet by mouth Daily., Disp: 30 tablet, Rfl: 3  •  LORazepam (ATIVAN) 0.5 MG tablet, Take 1 tablet by mouth Every 8 (Eight) Hours As Needed for anxiety., Disp:  90 tablet, Rfl: 5  •  memantine (NAMENDA XR) 28 MG capsule sustained-release 24 hr extended release capsule, Take 1 capsule by mouth daily., Disp: 90 capsule, Rfl: 3  •  Multiple Vitamins-Minerals (CENTRUM PO), Take 1 tablet by mouth daily., Disp: , Rfl:   •  nystatin (MYCOSTATIN) 600954 UNIT/ML suspension, Take 1 mL by mouth 4 (Four) Times a Day., Disp: 180 mL, Rfl: 0  •  omeprazole (priLOSEC) 20 MG capsule, TAKE 1 CAPSULE TWICE A DAY, Disp: 180 capsule, Rfl: 0  •  potassium chloride (K-DUR) 10 MEQ CR tablet, Take 10 mEq by mouth daily., Disp: , Rfl:   •  spironolactone (ALDACTONE) 50 MG tablet, Take 1 tablet by mouth Daily., Disp: 30 tablet, Rfl: 3  •  triamcinolone (KENALOG) 0.1 % cream, Apply  topically 2 (two) times a day., Disp: , Rfl:   •  venlafaxine XR (EFFEXOR-XR) 75 MG 24 hr capsule, Take 1 capsule by mouth Daily. mouth in the morning and 1 capsule(s) at night., Disp: 60 capsule, Rfl: 3    Current Facility-Administered Medications:   •  cyanocobalamin injection 1,000 mcg, 1,000 mcg, Intramuscular, Q30 Days, May Martin MD  •  ipratropium-albuterol (DUO-NEB) nebulizer solution 3 mL, 3 mL, Nebulization, 4x Daily - RT, May Martin MD    Past Surgical History   Procedure Laterality Date   • Carpal tunnel release     • Endoscopy and colonoscopy     • Injection of medication       Depo Medrol (80mg)   • Pre-malignant / benign skin lesion excision     • Hysterectomy     • Injection of medication       Kenalog (80mg)   • Skin tag removal     • Tonsillectomy         Family History   Problem Relation Age of Onset   • Depression Mother    • Diabetes Sister    • Osteoarthritis Sister    • Osteoporosis Sister    • Depression Brother    • Diabetes Brother    • Heart disease Brother    • Hypertension Brother    • Other Brother      smoking tobacco   • Cancer Brother      stomach   • Tuberculosis Other        Social History     Social History   • Marital status:      Spouse name: N/A   • Number  "of children: N/A   • Years of education: N/A     Occupational History   • Not on file.     Social History Main Topics   • Smoking status: Former Smoker   • Smokeless tobacco: Not on file   • Alcohol use No   • Drug use: No   • Sexual activity: Not on file     Other Topics Concern   • Not on file     Social History Narrative        Review of Systems   Constitutional: Negative for appetite change, chills, diaphoresis, fatigue, fever and unexpected weight change.   HENT: Negative.  Negative for congestion, dental problem, facial swelling, hearing loss, nosebleeds, postnasal drip, trouble swallowing and voice change.    Eyes: Positive for photophobia. Negative for pain, discharge, redness and itching.   Respiratory: Positive for wheezing. Negative for cough, choking, chest tightness, shortness of breath and stridor.    Cardiovascular: Negative.  Negative for chest pain, palpitations and leg swelling.   Gastrointestinal: Negative.  Negative for abdominal pain, blood in stool, constipation, diarrhea and nausea.   Endocrine: Negative.  Negative for cold intolerance and heat intolerance.   Genitourinary: Negative.  Negative for dysuria, frequency, hematuria and urgency.   Musculoskeletal: Positive for neck pain and neck stiffness. Negative for gait problem and joint swelling.   Skin: Negative.  Negative for pallor and rash.   Allergic/Immunologic: Negative.    Neurological: Positive for tingling, weakness and numbness. Negative for syncope, facial asymmetry, speech difficulty and headaches.   Hematological: Negative.    Psychiatric/Behavioral: Negative for agitation, behavioral problems, confusion, decreased concentration, dysphoric mood and hallucinations. The patient is nervous/anxious. The patient is not hyperactive.    All other systems reviewed and are negative.      PHYSICAL EXAMINATION:       Visit Vitals   • Ht 64\" (162.6 cm)   • Wt 180 lb (81.6 kg)   • BMI 30.9 kg/m2       Physical Exam   Constitutional: She is " oriented to person, place, and time. She appears well-developed and well-nourished. No distress.   Elderly.   HENT:   Head: Normocephalic.   Right Ear: External ear normal.   Left Ear: External ear normal.   Mouth/Throat: No oropharyngeal exudate.   Eyes: EOM are normal. Pupils are equal, round, and reactive to light. Right eye exhibits no discharge. Left eye exhibits no discharge. No scleral icterus.   Neck: No JVD present. Rigidity present. Decreased range of motion present. No tracheal deviation, no edema and no erythema present. No thyromegaly present.   Cardiovascular: Normal rate, regular rhythm, normal heart sounds and intact distal pulses.  Exam reveals no gallop and no friction rub.    No murmur heard.  Pulmonary/Chest: Effort normal and breath sounds normal. No respiratory distress. She has no wheezes. She has no rales. She exhibits no tenderness.   Abdominal: Soft. Bowel sounds are normal. She exhibits no distension and no mass. There is no tenderness. There is no guarding.   Musculoskeletal: She exhibits no edema or deformity.        Thoracic back: She exhibits normal range of motion, no tenderness, no bony tenderness, no swelling, no edema, no deformity, no laceration, no pain, no spasm and normal pulse.        Lumbar back: Normal. She exhibits normal range of motion, no tenderness, no bony tenderness, no swelling, no edema, no deformity, no laceration, no pain, no spasm and normal pulse.   Lymphadenopathy:     She has no cervical adenopathy.   Neurological: She is alert and oriented to person, place, and time. She has normal reflexes. She displays normal reflexes. No cranial nerve deficit. She exhibits normal muscle tone. Coordination normal.   Skin: Skin is warm and dry. No rash noted. She is not diaphoretic. No erythema.   Psychiatric: She has a normal mood and affect. Her behavior is normal. Thought content normal.       GAIT:     []  Normal  []  Antalgic    Assistive device: []  None  []   Walker     []  Crutches  []  Cane     []  Wheelchair  []  Stretcher    Back Exam     Tenderness   The patient is experiencing tenderness in the cervical.                  MRI 11/22/16 @ [facility]  Impression:   1. Diffuse spondylotic changes as detailed in the body of the report with no focal disc herniation or significant central canal stenosis. There is very  mild central canal stenosis at C4/5.   2. Multilevel neural; foraminal stenosis which is most marked on the left at C2/3 and C 4/5 secondary to uncovertebral spurring at these levels.  3. Anterolisthesis at C 4/5/ measuring 3.7 mm        ASSESSMENT:    Diagnoses and all orders for this visit:    Spinal stenosis of cervical region    Anterolisthesis  Comments:  C4/5    Cervical pain (neck)          PLAN    Reviewed MRI findings, which demonstrate 3 level degenerative disc disease.   Discussed treatment options at length.  She would like to try cervical injection therapy.  I reviewed the risks of epidural steroid injections with her and she voices understanding.  Xray Cervical spine.      Timmy Orellana MD

## 2017-03-06 ENCOUNTER — TELEPHONE (OUTPATIENT)
Dept: ORTHOPEDIC SURGERY | Facility: CLINIC | Age: 82
End: 2017-03-06

## 2017-03-06 DIAGNOSIS — M43.10 ANTEROLISTHESIS: Primary | ICD-10-CM

## 2017-03-06 DIAGNOSIS — M48.02 SPINAL STENOSIS OF CERVICAL REGION: ICD-10-CM

## 2017-03-16 ENCOUNTER — OFFICE VISIT (OUTPATIENT)
Dept: FAMILY MEDICINE CLINIC | Facility: CLINIC | Age: 82
End: 2017-03-16

## 2017-03-16 VITALS
DIASTOLIC BLOOD PRESSURE: 68 MMHG | TEMPERATURE: 97.8 F | HEIGHT: 64 IN | HEART RATE: 63 BPM | WEIGHT: 178 LBS | SYSTOLIC BLOOD PRESSURE: 118 MMHG | OXYGEN SATURATION: 84 % | BODY MASS INDEX: 30.39 KG/M2

## 2017-03-16 DIAGNOSIS — J44.0 CHRONIC OBSTRUCTIVE PULMONARY DISEASE WITH ACUTE LOWER RESPIRATORY INFECTION (HCC): Primary | ICD-10-CM

## 2017-03-16 DIAGNOSIS — J01.20 ACUTE NON-RECURRENT ETHMOIDAL SINUSITIS: ICD-10-CM

## 2017-03-16 DIAGNOSIS — J44.0 CHRONIC OBSTRUCTIVE PULMONARY DISEASE WITH ACUTE LOWER RESPIRATORY INFECTION (HCC): ICD-10-CM

## 2017-03-16 PROCEDURE — 99212 OFFICE O/P EST SF 10 MIN: CPT | Performed by: FAMILY MEDICINE

## 2017-03-16 RX ORDER — FLUTICASONE PROPIONATE 50 MCG
2 SPRAY, SUSPENSION (ML) NASAL DAILY
Qty: 1 EACH | Refills: 0 | Status: SHIPPED | OUTPATIENT
Start: 2017-03-16 | End: 2017-05-30 | Stop reason: SDUPTHER

## 2017-03-16 RX ORDER — DOXYCYCLINE HYCLATE 100 MG/1
100 TABLET, DELAYED RELEASE ORAL 2 TIMES DAILY
Qty: 20 TABLET | Refills: 0 | Status: SHIPPED | OUTPATIENT
Start: 2017-03-16 | End: 2017-03-16 | Stop reason: SDUPTHER

## 2017-03-16 RX ORDER — DOXYCYCLINE HYCLATE 100 MG/1
100 TABLET, DELAYED RELEASE ORAL 2 TIMES DAILY
Qty: 20 TABLET | Refills: 0 | Status: SHIPPED | OUTPATIENT
Start: 2017-03-16 | End: 2017-04-07

## 2017-03-16 RX ORDER — BENZONATATE 100 MG/1
100 CAPSULE ORAL 3 TIMES DAILY PRN
Qty: 30 CAPSULE | Refills: 0 | Status: SHIPPED | OUTPATIENT
Start: 2017-03-16 | End: 2017-03-16 | Stop reason: SDUPTHER

## 2017-03-16 RX ORDER — ALBUTEROL SULFATE 2.5 MG/3ML
2.5 SOLUTION RESPIRATORY (INHALATION)
Qty: 1 VIAL | Refills: 12 | Status: SHIPPED | OUTPATIENT
Start: 2017-03-16 | End: 2019-04-03

## 2017-03-16 RX ORDER — IPRATROPIUM BROMIDE AND ALBUTEROL SULFATE 2.5; .5 MG/3ML; MG/3ML
3 SOLUTION RESPIRATORY (INHALATION) ONCE
Status: DISCONTINUED | OUTPATIENT
Start: 2017-03-16 | End: 2017-04-07

## 2017-03-16 RX ORDER — BENZONATATE 100 MG/1
100 CAPSULE ORAL 3 TIMES DAILY PRN
Qty: 30 CAPSULE | Refills: 0 | Status: SHIPPED | OUTPATIENT
Start: 2017-03-16 | End: 2017-04-07

## 2017-03-16 NOTE — PROGRESS NOTES
"Subjective   Chief Complaint   Patient presents with   • Nasal Congestion     cough, about 3 days     Gavi Gordon is a 82 y.o. female.   Nasal Congestion (cough, about 3 days)    Cough   This is a new problem. The current episode started in the past 7 days. The problem has been unchanged. The problem occurs every few minutes. The cough is productive of sputum. Associated symptoms include headaches, nasal congestion, postnasal drip, rhinorrhea, a sore throat, shortness of breath and wheezing. Pertinent negatives include no chills, ear congestion, ear pain or fever. The symptoms are aggravated by lying down. She has tried OTC cough suppressant and rest for the symptoms. The treatment provided no relief. Her past medical history is significant for COPD.      The following portions of the patient's history were reviewed and updated as appropriate: allergies, current medications, past family history, past medical history, past social history, past surgical history and problem list.    Review of Systems   Constitutional: Negative for chills and fever.   HENT: Positive for postnasal drip, rhinorrhea and sore throat. Negative for ear pain.    Respiratory: Positive for cough, shortness of breath and wheezing.    Neurological: Positive for headaches.       Objective   Visit Vitals   • /68   • Pulse 63   • Temp 97.8 °F (36.6 °C)   • Ht 64\" (162.6 cm)   • Wt 178 lb (80.7 kg)   • LMP Comment: hysterectomy   • SpO2 (!) 84%   • BMI 30.55 kg/m2     Physical Exam   Constitutional: She is oriented to person, place, and time. She appears well-developed and well-nourished.   HENT:   Head: Normocephalic and atraumatic.   Postnasal drainage   Eyes: Pupils are equal, round, and reactive to light.   Neck: Normal range of motion. Neck supple.   Cardiovascular: Normal rate, regular rhythm and normal heart sounds.    Pulmonary/Chest: No respiratory distress. She has wheezes. She has rales.   Abdominal: Soft. Bowel sounds are normal. "   Musculoskeletal: Normal range of motion.   Neurological: She is alert and oriented to person, place, and time.   Skin: Skin is warm and dry.   Psychiatric: She has a normal mood and affect.   Nursing note and vitals reviewed.      Assessment/Plan   Problems Addressed this Visit        Respiratory    Chronic obstructive pulmonary disease - Primary    Relevant Medications    ipratropium-albuterol (DUO-NEB) nebulizer solution 3 mL (Start on 3/16/2017  3:15 PM)    doxycycline (DORYX) 100 MG enteric coated tablet    fluticasone (FLONASE) 50 MCG/ACT nasal spray    benzonatate (TESSALON PERLES) 100 MG capsule      Other Visit Diagnoses     Acute non-recurrent ethmoidal sinusitis        Relevant Medications    fluticasone (FLONASE) 50 MCG/ACT nasal spray        Start duoneb in the office - improved wheezing    Start tessalon perles for cough    Continue with regular inhalers    Start abx    Restart nasal spray    Recheck as needed

## 2017-03-21 ENCOUNTER — OFFICE VISIT (OUTPATIENT)
Dept: FAMILY MEDICINE CLINIC | Facility: CLINIC | Age: 82
End: 2017-03-21

## 2017-03-21 VITALS
HEIGHT: 64 IN | TEMPERATURE: 96.9 F | OXYGEN SATURATION: 80 % | SYSTOLIC BLOOD PRESSURE: 126 MMHG | DIASTOLIC BLOOD PRESSURE: 58 MMHG | WEIGHT: 176 LBS | BODY MASS INDEX: 30.05 KG/M2 | HEART RATE: 66 BPM

## 2017-03-21 DIAGNOSIS — R06.02 SHORTNESS OF BREATH: Primary | ICD-10-CM

## 2017-03-21 DIAGNOSIS — R05.9 COUGH: ICD-10-CM

## 2017-03-21 DIAGNOSIS — J44.1 COPD WITH EXACERBATION (HCC): ICD-10-CM

## 2017-03-21 PROCEDURE — 96372 THER/PROPH/DIAG INJ SC/IM: CPT | Performed by: FAMILY MEDICINE

## 2017-03-21 PROCEDURE — 99213 OFFICE O/P EST LOW 20 MIN: CPT | Performed by: FAMILY MEDICINE

## 2017-03-21 RX ORDER — IPRATROPIUM BROMIDE AND ALBUTEROL SULFATE 2.5; .5 MG/3ML; MG/3ML
3 SOLUTION RESPIRATORY (INHALATION) ONCE
Status: DISCONTINUED | OUTPATIENT
Start: 2017-03-21 | End: 2017-04-07

## 2017-03-21 RX ORDER — TRIAMCINOLONE ACETONIDE 40 MG/ML
80 INJECTION, SUSPENSION INTRA-ARTICULAR; INTRAMUSCULAR ONCE
Status: COMPLETED | OUTPATIENT
Start: 2017-03-21 | End: 2017-03-21

## 2017-03-21 RX ADMIN — TRIAMCINOLONE ACETONIDE 80 MG: 40 INJECTION, SUSPENSION INTRA-ARTICULAR; INTRAMUSCULAR at 15:22

## 2017-03-21 NOTE — PROGRESS NOTES
"Subjective   Chief Complaint   Patient presents with   • not feeling any better     congestion     Gavi Gordon is a 82 y.o. female.   not feeling any better (congestion)    Shortness of Breath   This is a new problem. The current episode started 1 to 4 weeks ago. The problem occurs constantly. The problem has been unchanged. Associated symptoms include wheezing. Pertinent negatives include no abdominal pain, chest pain, ear pain, fever, headaches, neck pain, rash, rhinorrhea or sore throat. The symptoms are aggravated by any activity. She has tried rest and beta agonist inhalers for the symptoms. Her past medical history is significant for COPD.   currently prescribed oxygen 2LPM by [facility] - followed by pulmonologist  Presents today without any oxygen - her O2 was at 80% on room air  She was recently seen for copd exacerbation in the office and given duoneb and started on doxycycline - she has 3 days left     The following portions of the patient's history were reviewed and updated as appropriate: allergies, current medications, past family history, past medical history, past social history, past surgical history and problem list.    Review of Systems   Constitutional: Negative for appetite change, chills, fatigue and fever.   HENT: Negative for congestion, ear pain, rhinorrhea and sore throat.    Eyes: Negative for pain.   Respiratory: Positive for cough, chest tightness, shortness of breath and wheezing.    Cardiovascular: Negative for chest pain and palpitations.   Gastrointestinal: Negative for abdominal pain, constipation, diarrhea and nausea.   Genitourinary: Negative for dysuria.   Musculoskeletal: Negative for back pain, joint swelling and neck pain.   Skin: Negative for rash.   Neurological: Negative for dizziness and headaches.       Objective   Visit Vitals   • /58   • Pulse 66   • Temp 96.9 °F (36.1 °C)   • Ht 64\" (162.6 cm)   • Wt 176 lb (79.8 kg)   • LMP Comment: hysterectomy   • SpO2 (!) 80%   • " BMI 30.21 kg/m2     Physical Exam   Constitutional: She is oriented to person, place, and time. She appears well-developed and well-nourished.   HENT:   Head: Normocephalic and atraumatic.   Eyes: Pupils are equal, round, and reactive to light.   Neck: Normal range of motion. Neck supple.   Cardiovascular: Normal rate, regular rhythm and normal heart sounds.    Pulmonary/Chest: No respiratory distress. She has wheezes. She has rales in the right lower field.   Abdominal: Soft. Bowel sounds are normal.   Musculoskeletal: Normal range of motion.   Neurological: She is alert and oriented to person, place, and time.   Skin: Skin is warm and dry.   Psychiatric: She has a normal mood and affect.   Nursing note and vitals reviewed.      Assessment/Plan   Problems Addressed this Visit        Respiratory    COPD with exacerbation    Relevant Medications    ipratropium-albuterol (DUO-NEB) nebulizer solution 3 mL      Other Visit Diagnoses     Shortness of breath    -  Primary    Relevant Orders    XR Chest 2 View (Completed)    Cough        Relevant Medications    triamcinolone acetonide (KENALOG-40) injection 80 mg (Completed) (Start on 3/21/2017  4:00 PM)        duoneb in the office    Kenalog 80mg IM today    cxr ordered - no acute disease    Oxygen by nasal canula placed on patient in office - improved to 97%    Recommended to finish abx  Once home use oxygen and inhalers as directed  Follow up with pulmonologist as scheduled on 3/28    If symptoms worsen go to the ER

## 2017-03-29 ENCOUNTER — HOSPITAL ENCOUNTER (OUTPATIENT)
Dept: INTERVENTIONAL RADIOLOGY/VASCULAR | Facility: HOSPITAL | Age: 82
Discharge: HOME | End: 2017-03-29
Admitting: ORTHOPAEDIC SURGERY

## 2017-03-29 VITALS
RESPIRATION RATE: 20 BRPM | HEART RATE: 59 BPM | DIASTOLIC BLOOD PRESSURE: 56 MMHG | SYSTOLIC BLOOD PRESSURE: 119 MMHG | OXYGEN SATURATION: 90 %

## 2017-03-29 DIAGNOSIS — R52 PAIN: ICD-10-CM

## 2017-03-29 PROCEDURE — 25010000002 METHYLPREDNISOLONE PER 80 MG: Performed by: ORTHOPAEDIC SURGERY

## 2017-03-29 PROCEDURE — 0 IOPAMIDOL 41 % SOLUTION: Performed by: ORTHOPAEDIC SURGERY

## 2017-03-29 PROCEDURE — 64479 NJX AA&/STRD TFRM EPI C/T 1: CPT | Performed by: ORTHOPAEDIC SURGERY

## 2017-03-29 RX ORDER — METHYLPREDNISOLONE ACETATE 80 MG/ML
INJECTION, SUSPENSION INTRA-ARTICULAR; INTRALESIONAL; INTRAMUSCULAR; SOFT TISSUE
Status: COMPLETED | OUTPATIENT
Start: 2017-03-29 | End: 2017-03-29

## 2017-03-29 RX ORDER — BUPIVACAINE HYDROCHLORIDE 5 MG/ML
INJECTION, SOLUTION EPIDURAL; INTRACAUDAL
Status: COMPLETED | OUTPATIENT
Start: 2017-03-29 | End: 2017-03-29

## 2017-03-29 RX ADMIN — IOPAMIDOL 5 ML: 408 INJECTION, SOLUTION INTRATHECAL at 13:48

## 2017-03-29 RX ADMIN — BUPIVACAINE HYDROCHLORIDE 10 ML: 5 INJECTION, SOLUTION EPIDURAL; INTRACAUDAL; PERINEURAL at 13:48

## 2017-03-29 RX ADMIN — METHYLPREDNISOLONE ACETATE 80 MG: 80 INJECTION, SUSPENSION INTRA-ARTICULAR; INTRALESIONAL; INTRAMUSCULAR; SOFT TISSUE at 13:47

## 2017-04-07 ENCOUNTER — OFFICE VISIT (OUTPATIENT)
Dept: FAMILY MEDICINE CLINIC | Facility: CLINIC | Age: 82
End: 2017-04-07

## 2017-04-07 VITALS
SYSTOLIC BLOOD PRESSURE: 120 MMHG | TEMPERATURE: 96.3 F | OXYGEN SATURATION: 86 % | HEART RATE: 66 BPM | WEIGHT: 179 LBS | HEIGHT: 64 IN | BODY MASS INDEX: 30.56 KG/M2 | DIASTOLIC BLOOD PRESSURE: 58 MMHG

## 2017-04-07 DIAGNOSIS — K52.9 CHRONIC DIARRHEA: ICD-10-CM

## 2017-04-07 DIAGNOSIS — E53.8 B12 DEFICIENCY: Primary | ICD-10-CM

## 2017-04-07 DIAGNOSIS — J44.9 CHRONIC OBSTRUCTIVE PULMONARY DISEASE, UNSPECIFIED COPD TYPE (HCC): ICD-10-CM

## 2017-04-07 PROBLEM — J44.1 COPD WITH EXACERBATION (HCC): Status: RESOLVED | Noted: 2017-03-21 | Resolved: 2017-04-07

## 2017-04-07 PROCEDURE — 99213 OFFICE O/P EST LOW 20 MIN: CPT | Performed by: FAMILY MEDICINE

## 2017-04-07 PROCEDURE — 96372 THER/PROPH/DIAG INJ SC/IM: CPT | Performed by: FAMILY MEDICINE

## 2017-04-07 RX ORDER — DIPHENOXYLATE HYDROCHLORIDE AND ATROPINE SULFATE 2.5; .025 MG/1; MG/1
TABLET ORAL
Qty: 180 TABLET | Refills: 0 | Status: SHIPPED | OUTPATIENT
Start: 2017-04-07 | End: 2017-07-21 | Stop reason: SDUPTHER

## 2017-04-07 RX ADMIN — CYANOCOBALAMIN 1000 MCG: 1000 INJECTION, SOLUTION INTRAMUSCULAR; SUBCUTANEOUS at 09:43

## 2017-04-07 NOTE — PROGRESS NOTES
"Subjective   Chief Complaint   Patient presents with   • Fatigue     weak   • Med Refill     aGvi Gordon is a 82 y.o. female.   Fatigue (weak) and Med Refill    History of Present Illness     Complaining of fatigue  Patient was recently treated for copd exacerbation with steroid injection and duoneb with doxycycline  This caused her to have tar colored diarrhea  Was evaluated in the emergency room at [facility]  Negative for blood in the stool  Since then bowel movements returned to normal  Last BM was 4/7 and bristol stool chart type 4  She has completed her course of treatment but complaining of heartburn symptoms not controlled with prilosec 20mg BID and abdominal pain    Inquiring about b12 IM  She would like to start monthly injections and stop her oral supplement    Needs a refill on lomotil    The following portions of the patient's history were reviewed and updated as appropriate: allergies, current medications, past family history, past medical history, past social history, past surgical history and problem list.    Review of Systems   Constitutional: Positive for fatigue. Negative for appetite change, chills and fever.   HENT: Negative for congestion, ear pain, rhinorrhea and sore throat.    Eyes: Negative for pain.   Respiratory: Negative for cough and shortness of breath.    Cardiovascular: Negative for chest pain and palpitations.   Gastrointestinal: Positive for abdominal pain. Negative for constipation, diarrhea and nausea.        Heartburn   Genitourinary: Negative for dysuria.   Musculoskeletal: Negative for back pain, joint swelling and neck pain.   Skin: Negative for rash.   Neurological: Negative for dizziness and headaches.       Objective   /58  Pulse 66  Temp 96.3 °F (35.7 °C)  Ht 64\" (162.6 cm)  Wt 179 lb (81.2 kg)  SpO2 (!) 86%  BMI 30.73 kg/m2  Physical Exam   Constitutional: She is oriented to person, place, and time. She appears well-developed and well-nourished. "   HENT:   Head: Normocephalic and atraumatic.   Cardiovascular: Normal rate and regular rhythm.    Pulmonary/Chest: She has wheezes.   Musculoskeletal:   Ambulating with cane   Neurological: She is alert and oriented to person, place, and time.   Skin: Skin is warm and dry.   Psychiatric: She has a normal mood and affect.   Nursing note and vitals reviewed.      Assessment/Plan   Problems Addressed this Visit        Respiratory    Chronic obstructive pulmonary disease       Digestive    Chronic diarrhea    Relevant Medications    diphenoxylate-atropine (LOMOTIL) 2.5-0.025 MG per tablet    B12 deficiency - Primary        Start b12 IM monthly  Recheck monthly for injection    Refilled lomotil

## 2017-04-25 RX ORDER — DONEPEZIL HYDROCHLORIDE 10 MG/1
TABLET, FILM COATED ORAL
Qty: 90 TABLET | Refills: 1 | Status: SHIPPED | OUTPATIENT
Start: 2017-04-25 | End: 2017-10-22 | Stop reason: SDUPTHER

## 2017-04-25 RX ORDER — SPIRONOLACTONE 50 MG/1
TABLET, FILM COATED ORAL
Qty: 90 TABLET | Refills: 1 | Status: SHIPPED | OUTPATIENT
Start: 2017-04-25 | End: 2017-10-22 | Stop reason: SDUPTHER

## 2017-05-02 ENCOUNTER — OFFICE VISIT (OUTPATIENT)
Dept: FAMILY MEDICINE CLINIC | Facility: CLINIC | Age: 82
End: 2017-05-02

## 2017-05-02 VITALS
OXYGEN SATURATION: 78 % | DIASTOLIC BLOOD PRESSURE: 80 MMHG | TEMPERATURE: 97.7 F | BODY MASS INDEX: 30.39 KG/M2 | WEIGHT: 178 LBS | SYSTOLIC BLOOD PRESSURE: 138 MMHG | HEIGHT: 64 IN | HEART RATE: 75 BPM

## 2017-05-02 DIAGNOSIS — J30.89 PERENNIAL ALLERGIC RHINITIS, UNSPECIFIED ALLERGIC RHINITIS TRIGGER: ICD-10-CM

## 2017-05-02 DIAGNOSIS — K21.9 GASTROESOPHAGEAL REFLUX DISEASE WITHOUT ESOPHAGITIS: ICD-10-CM

## 2017-05-02 DIAGNOSIS — R60.0 BILATERAL EDEMA OF LOWER EXTREMITY: Primary | ICD-10-CM

## 2017-05-02 DIAGNOSIS — E53.8 B12 DEFICIENCY: ICD-10-CM

## 2017-05-02 PROCEDURE — 99214 OFFICE O/P EST MOD 30 MIN: CPT | Performed by: FAMILY MEDICINE

## 2017-05-02 PROCEDURE — 96372 THER/PROPH/DIAG INJ SC/IM: CPT | Performed by: FAMILY MEDICINE

## 2017-05-02 RX ORDER — MONTELUKAST SODIUM 10 MG/1
10 TABLET ORAL NIGHTLY
Qty: 90 TABLET | Refills: 0 | Status: SHIPPED | OUTPATIENT
Start: 2017-05-02 | End: 2019-09-24 | Stop reason: SDUPTHER

## 2017-05-02 RX ORDER — RANITIDINE 150 MG/1
150 TABLET ORAL 2 TIMES DAILY PRN
Qty: 30 TABLET | Refills: 0 | Status: SHIPPED | OUTPATIENT
Start: 2017-05-02 | End: 2018-05-14 | Stop reason: SDUPTHER

## 2017-05-02 RX ADMIN — CYANOCOBALAMIN 1000 MCG: 1000 INJECTION, SOLUTION INTRAMUSCULAR; SUBCUTANEOUS at 16:01

## 2017-05-04 PROBLEM — K21.9 GASTROESOPHAGEAL REFLUX DISEASE WITHOUT ESOPHAGITIS: Status: ACTIVE | Noted: 2017-05-04

## 2017-05-04 PROBLEM — R60.0 BILATERAL EDEMA OF LOWER EXTREMITY: Status: ACTIVE | Noted: 2017-05-04

## 2017-05-04 PROBLEM — J30.89 PERENNIAL ALLERGIC RHINITIS: Status: ACTIVE | Noted: 2017-05-04

## 2017-05-09 RX ORDER — OMEPRAZOLE 20 MG/1
CAPSULE, DELAYED RELEASE ORAL
Qty: 180 CAPSULE | Refills: 3 | Status: SHIPPED | OUTPATIENT
Start: 2017-05-09 | End: 2018-05-07 | Stop reason: SDUPTHER

## 2017-05-11 RX ORDER — VENLAFAXINE HYDROCHLORIDE 75 MG/1
CAPSULE, EXTENDED RELEASE ORAL
Qty: 240 CAPSULE | Refills: 3 | Status: SHIPPED | OUTPATIENT
Start: 2017-05-11 | End: 2017-05-30 | Stop reason: SDUPTHER

## 2017-05-12 RX ORDER — CLOBETASOL PROPIONATE 0.5 MG/G
AEROSOL, FOAM TOPICAL 2 TIMES DAILY
Qty: 1 EACH | Refills: 5 | Status: SHIPPED | OUTPATIENT
Start: 2017-05-12 | End: 2018-05-14

## 2017-05-30 ENCOUNTER — OFFICE VISIT (OUTPATIENT)
Dept: FAMILY MEDICINE CLINIC | Facility: CLINIC | Age: 82
End: 2017-05-30

## 2017-05-30 VITALS
OXYGEN SATURATION: 91 % | BODY MASS INDEX: 30.22 KG/M2 | WEIGHT: 177 LBS | HEIGHT: 64 IN | TEMPERATURE: 97.2 F | HEART RATE: 64 BPM | SYSTOLIC BLOOD PRESSURE: 132 MMHG | DIASTOLIC BLOOD PRESSURE: 70 MMHG

## 2017-05-30 DIAGNOSIS — J44.1 CHRONIC OBSTRUCTIVE PULMONARY DISEASE WITH ACUTE EXACERBATION (HCC): Primary | ICD-10-CM

## 2017-05-30 DIAGNOSIS — R05.9 COUGH: ICD-10-CM

## 2017-05-30 DIAGNOSIS — J01.20 ACUTE NON-RECURRENT ETHMOIDAL SINUSITIS: ICD-10-CM

## 2017-05-30 DIAGNOSIS — K59.00 CONSTIPATION, UNSPECIFIED CONSTIPATION TYPE: ICD-10-CM

## 2017-05-30 PROCEDURE — 99213 OFFICE O/P EST LOW 20 MIN: CPT | Performed by: FAMILY MEDICINE

## 2017-05-30 RX ORDER — LACTULOSE 20 G/30ML
20 SOLUTION ORAL DAILY
Qty: 120 ML | Refills: 0 | Status: SHIPPED | OUTPATIENT
Start: 2017-05-30 | End: 2017-08-16

## 2017-05-30 RX ORDER — METHYLPREDNISOLONE 4 MG/1
TABLET ORAL
Qty: 21 EACH | Refills: 0 | Status: SHIPPED | OUTPATIENT
Start: 2017-05-30 | End: 2017-06-05

## 2017-05-30 RX ORDER — IPRATROPIUM BROMIDE AND ALBUTEROL SULFATE 2.5; .5 MG/3ML; MG/3ML
3 SOLUTION RESPIRATORY (INHALATION) ONCE
Status: DISCONTINUED | OUTPATIENT
Start: 2017-05-30 | End: 2018-08-13

## 2017-05-30 RX ORDER — FLUTICASONE PROPIONATE 50 MCG
2 SPRAY, SUSPENSION (ML) NASAL DAILY
Qty: 1 EACH | Refills: 3 | Status: SHIPPED | OUTPATIENT
Start: 2017-05-30 | End: 2019-09-24 | Stop reason: SDUPTHER

## 2017-05-30 RX ORDER — BENZONATATE 200 MG/1
200 CAPSULE ORAL 3 TIMES DAILY PRN
Qty: 30 CAPSULE | Refills: 0 | Status: SHIPPED | OUTPATIENT
Start: 2017-05-30 | End: 2017-08-16

## 2017-05-30 RX ORDER — VENLAFAXINE HYDROCHLORIDE 75 MG/1
CAPSULE, EXTENDED RELEASE ORAL
Qty: 240 CAPSULE | Refills: 3 | Status: SHIPPED | OUTPATIENT
Start: 2017-05-30 | End: 2018-04-17 | Stop reason: SDUPTHER

## 2017-07-21 DIAGNOSIS — K52.9 CHRONIC DIARRHEA: ICD-10-CM

## 2017-07-21 RX ORDER — DIPHENOXYLATE HYDROCHLORIDE AND ATROPINE SULFATE 2.5; .025 MG/1; MG/1
TABLET ORAL
Qty: 180 TABLET | Refills: 0 | Status: SHIPPED | OUTPATIENT
Start: 2017-07-21 | End: 2019-09-24 | Stop reason: SDUPTHER

## 2017-07-26 RX ORDER — LEVOTHYROXINE SODIUM 0.05 MG/1
TABLET ORAL
Qty: 90 TABLET | Refills: 0 | Status: SHIPPED | OUTPATIENT
Start: 2017-07-26 | End: 2017-10-24 | Stop reason: SDUPTHER

## 2017-08-02 ENCOUNTER — OFFICE VISIT (OUTPATIENT)
Dept: FAMILY MEDICINE CLINIC | Facility: CLINIC | Age: 82
End: 2017-08-02

## 2017-08-02 VITALS
DIASTOLIC BLOOD PRESSURE: 66 MMHG | BODY MASS INDEX: 28.34 KG/M2 | WEIGHT: 166 LBS | HEIGHT: 64 IN | TEMPERATURE: 97.8 F | HEART RATE: 70 BPM | OXYGEN SATURATION: 92 % | SYSTOLIC BLOOD PRESSURE: 126 MMHG

## 2017-08-02 DIAGNOSIS — L98.9 SKIN LESION OF RIGHT UPPER EXTREMITY: Primary | ICD-10-CM

## 2017-08-02 DIAGNOSIS — E53.8 B12 DEFICIENCY: ICD-10-CM

## 2017-08-02 PROCEDURE — 99213 OFFICE O/P EST LOW 20 MIN: CPT | Performed by: FAMILY MEDICINE

## 2017-08-02 PROCEDURE — 96372 THER/PROPH/DIAG INJ SC/IM: CPT | Performed by: FAMILY MEDICINE

## 2017-08-02 RX ADMIN — CYANOCOBALAMIN 1000 MCG: 1000 INJECTION, SOLUTION INTRAMUSCULAR; SUBCUTANEOUS at 15:47

## 2017-08-03 NOTE — PROGRESS NOTES
"Subjective   Chief Complaint   Patient presents with   • spot on arm     right arm   • B12 Injection     Gavi Gordon is a 82 y.o. female.   spot on arm (right arm) and B12 Injection    History of Present Illness     Concerned about new skin lesion noticed on the right upper extremity  No history of skin lesion in this area before  Has no complaints of pain, pruritus  Has seen a dermatologist before for several skin lesions - [place], [place]    b12 deficiency - supplemented monthly  Due today    The following portions of the patient's history were reviewed and updated as appropriate: allergies, current medications, past family history, past medical history, past social history, past surgical history and problem list.    Review of Systems   Skin:        Skin lesion       Objective   /66  Pulse 70  Temp 97.8 °F (36.6 °C)  Ht 64\" (162.6 cm)  Wt 166 lb (75.3 kg)  SpO2 92%  BMI 28.49 kg/m2  Physical Exam   Constitutional: She is oriented to person, place, and time. She appears well-developed and well-nourished.   HENT:   Head: Normocephalic and atraumatic.   Neurological: She is alert and oriented to person, place, and time.   Skin: Skin is warm and dry.   Skin lesion noted on the right upper extremity at the medial aspect  Lightly pigmented, rough texture  Suspicious skin lesion with extension laterally  Visible \"pull\" in the skin       Assessment/Plan   Problems Addressed this Visit        Digestive    B12 deficiency      Other Visit Diagnoses     Skin lesion of right upper extremity    -  Primary        Appointment scheduled with dermatology    b12 IM today    Recheck as needed           "

## 2017-08-14 RX ORDER — LORAZEPAM 0.5 MG/1
TABLET ORAL
Qty: 90 TABLET | Refills: 2 | Status: CANCELLED | OUTPATIENT
Start: 2017-08-14

## 2017-08-15 RX ORDER — LORAZEPAM 0.5 MG/1
0.5 TABLET ORAL EVERY 8 HOURS PRN
Qty: 90 TABLET | Refills: 5 | Status: SHIPPED | OUTPATIENT
Start: 2017-08-15 | End: 2018-01-04 | Stop reason: SDUPTHER

## 2017-08-16 ENCOUNTER — LAB (OUTPATIENT)
Dept: LAB | Facility: OTHER | Age: 82
End: 2017-08-16

## 2017-08-16 ENCOUNTER — OFFICE VISIT (OUTPATIENT)
Dept: FAMILY MEDICINE CLINIC | Facility: CLINIC | Age: 82
End: 2017-08-16

## 2017-08-16 VITALS
HEART RATE: 88 BPM | BODY MASS INDEX: 28.85 KG/M2 | HEIGHT: 64 IN | SYSTOLIC BLOOD PRESSURE: 136 MMHG | DIASTOLIC BLOOD PRESSURE: 80 MMHG | TEMPERATURE: 97.4 F | OXYGEN SATURATION: 98 % | WEIGHT: 169 LBS

## 2017-08-16 DIAGNOSIS — L03.116 CELLULITIS OF LEFT LOWER EXTREMITY: ICD-10-CM

## 2017-08-16 DIAGNOSIS — R60.9 PITTING EDEMA: ICD-10-CM

## 2017-08-16 DIAGNOSIS — Z12.31 ENCOUNTER FOR SCREENING MAMMOGRAM FOR BREAST CANCER: Primary | ICD-10-CM

## 2017-08-16 DIAGNOSIS — L03.116 CELLULITIS OF LEFT LOWER EXTREMITY: Primary | ICD-10-CM

## 2017-08-16 LAB
ALBUMIN SERPL-MCNC: 2.8 G/DL (ref 3.2–5.5)
ALBUMIN/GLOB SERPL: 0.6 G/DL (ref 1–3)
ALP SERPL-CCNC: 68 U/L (ref 15–121)
ALT SERPL W P-5'-P-CCNC: 17 U/L (ref 10–60)
ANION GAP SERPL CALCULATED.3IONS-SCNC: 13 MMOL/L (ref 5–15)
AST SERPL-CCNC: 23 U/L (ref 10–60)
BILIRUB SERPL-MCNC: 0.8 MG/DL (ref 0.2–1)
BUN BLD-MCNC: 18 MG/DL (ref 8–25)
BUN/CREAT SERPL: 18 (ref 7–25)
CALCIUM SPEC-SCNC: 8.9 MG/DL (ref 8.4–10.8)
CHLORIDE SERPL-SCNC: 95 MMOL/L (ref 100–112)
CO2 SERPL-SCNC: 29 MMOL/L (ref 20–32)
CREAT BLD-MCNC: 1 MG/DL (ref 0.4–1.3)
DEPRECATED RDW RBC AUTO: 53.7 FL (ref 36.4–46.3)
EOSINOPHIL # BLD MANUAL: 0.65 10*3/MM3 (ref 0–0.7)
EOSINOPHIL NFR BLD MANUAL: 7 % (ref 0–7)
ERYTHROCYTE [DISTWIDTH] IN BLOOD BY AUTOMATED COUNT: 15.5 % (ref 11.5–14.5)
GFR SERPL CREATININE-BSD FRML MDRD: 53 ML/MIN/1.73 (ref 39–90)
GLOBULIN UR ELPH-MCNC: 4.4 GM/DL (ref 2.5–4.6)
GLUCOSE BLD-MCNC: 99 MG/DL (ref 70–100)
HCT VFR BLD AUTO: 36.2 % (ref 35–45)
HGB BLD-MCNC: 11.3 G/DL (ref 12–15.5)
LYMPHOCYTES # BLD MANUAL: 2.15 10*3/MM3 (ref 0.6–4.2)
LYMPHOCYTES NFR BLD MANUAL: 23 % (ref 10–50)
LYMPHOCYTES NFR BLD MANUAL: 7 % (ref 0–12)
MCH RBC QN AUTO: 30.7 PG (ref 26.5–34)
MCHC RBC AUTO-ENTMCNC: 31.2 G/DL (ref 31.4–36)
MCV RBC AUTO: 98.4 FL (ref 80–98)
MONOCYTES # BLD AUTO: 0.65 10*3/MM3 (ref 0–0.9)
NEUTROPHILS # BLD AUTO: 5.88 10*3/MM3 (ref 2–8.6)
NEUTROPHILS NFR BLD MANUAL: 62 % (ref 37–80)
NEUTS BAND NFR BLD MANUAL: 1 % (ref 0–5)
PLATELET # BLD AUTO: 223 10*3/MM3 (ref 150–450)
PMV BLD AUTO: 9.9 FL (ref 8–12)
POTASSIUM BLD-SCNC: 3.7 MMOL/L (ref 3.4–5.4)
PROT SERPL-MCNC: 7.2 G/DL (ref 6.7–8.2)
RBC # BLD AUTO: 3.68 10*6/MM3 (ref 3.77–5.16)
RBC MORPH BLD: NORMAL
SMALL PLATELETS BLD QL SMEAR: ADEQUATE
SODIUM BLD-SCNC: 137 MMOL/L (ref 134–146)
WBC MORPH BLD: NORMAL
WBC NRBC COR # BLD: 9.34 10*3/MM3 (ref 3.2–9.8)

## 2017-08-16 PROCEDURE — 36415 COLL VENOUS BLD VENIPUNCTURE: CPT | Performed by: FAMILY MEDICINE

## 2017-08-16 PROCEDURE — 85025 COMPLETE CBC W/AUTO DIFF WBC: CPT | Performed by: FAMILY MEDICINE

## 2017-08-16 PROCEDURE — 99214 OFFICE O/P EST MOD 30 MIN: CPT | Performed by: FAMILY MEDICINE

## 2017-08-16 PROCEDURE — 80053 COMPREHEN METABOLIC PANEL: CPT | Performed by: FAMILY MEDICINE

## 2017-08-16 RX ORDER — CLINDAMYCIN HYDROCHLORIDE 300 MG/1
300 CAPSULE ORAL 4 TIMES DAILY
Qty: 40 CAPSULE | Refills: 0 | Status: SHIPPED | OUTPATIENT
Start: 2017-08-16 | End: 2017-08-26

## 2017-08-16 RX ORDER — CLINDAMYCIN HYDROCHLORIDE 300 MG/1
300 CAPSULE ORAL 4 TIMES DAILY
Qty: 40 CAPSULE | Refills: 0 | Status: SHIPPED | OUTPATIENT
Start: 2017-08-16 | End: 2017-08-16 | Stop reason: SDUPTHER

## 2017-08-16 NOTE — PROGRESS NOTES
"Subjective   Chief Complaint   Patient presents with   • hospital follow up     [facility]     Gavi Gordon is a 82 y.o. female.   hospital follow up ([facility])    History of Present Illness     Was seen by ER at [facility] on 8/11/17 for concerns of left lower extremity changes  She was diagnosed with left lower extremity cellulitis   Concerns for peripheral vascular disease  She was started on levaquin   Since home she has continued her abx but has not noticed much improvement  Continues to have significant edema of the left foot and leg with skin changes including dark discoloration and blister formation  She has started having pain in the toes that is intermittent  She is prescribed lasix 40mg BID but has been taking 80mg in the am only  Denies fever, chills    The following portions of the patient's history were reviewed and updated as appropriate: allergies, current medications, past family history, past medical history, past social history, past surgical history and problem list.    Review of Systems   Constitutional: Negative for appetite change, chills, fatigue and fever.   HENT: Negative for congestion, ear pain, rhinorrhea and sore throat.    Eyes: Negative for pain.   Respiratory: Negative for cough and shortness of breath.    Cardiovascular: Positive for leg swelling. Negative for chest pain and palpitations.   Gastrointestinal: Negative for abdominal pain, constipation, diarrhea and nausea.   Genitourinary: Negative for dysuria.   Musculoskeletal: Negative for back pain, joint swelling and neck pain.   Skin: Negative for rash.   Neurological: Negative for dizziness and headaches.       Objective   /80  Pulse 88  Temp 97.4 °F (36.3 °C)  Ht 64\" (162.6 cm)  Wt 169 lb (76.7 kg)  SpO2 98%  BMI 29.01 kg/m2  Physical Exam   Constitutional: She is oriented to person, place, and time. She appears well-developed and well-nourished.   HENT:   Head: Normocephalic and atraumatic.   Eyes: Pupils are " equal, round, and reactive to light.   Neck: Normal range of motion. Neck supple.   Cardiovascular: Normal rate, regular rhythm and normal heart sounds.    +3 pitting edema of the left lower extremity     Pulmonary/Chest: Effort normal and breath sounds normal. No respiratory distress. She has no wheezes. She has no rales.   Abdominal: Soft. Bowel sounds are normal.   Musculoskeletal: Normal range of motion.   Neurological: She is alert and oriented to person, place, and time.   Skin: Skin is warm and dry.   Lower extremity discoloration  Multiple blisters noted along the left lower extremity  Palpable warmth of the leg  3+ pitting edema   Psychiatric: She has a normal mood and affect.   Nursing note and vitals reviewed.      Assessment/Plan   Problems Addressed this Visit     None      Visit Diagnoses     Cellulitis of left lower extremity    -  Primary    Relevant Medications    clindamycin (CLEOCIN) 300 MG capsule    Other Relevant Orders    Ambulatory Referral to Home Health    CBC & Differential    Comprehensive Metabolic Panel    Pitting edema            Change lasix to 40mg twice a day  Elevate lower legs  Use compression stockings  Leg wrapped in the office  Referral to home health  Patient provided care instructions, if worsened then she needs to proceed to the ER  Recheck in one week

## 2017-08-17 DIAGNOSIS — R41.3 MEMORY IMPAIRMENT: ICD-10-CM

## 2017-08-17 RX ORDER — MEMANTINE HYDROCHLORIDE 28 MG/1
CAPSULE, EXTENDED RELEASE ORAL
Qty: 90 CAPSULE | Refills: 2 | Status: SHIPPED | OUTPATIENT
Start: 2017-08-17 | End: 2018-05-14 | Stop reason: SDUPTHER

## 2017-08-18 ENCOUNTER — TELEPHONE (OUTPATIENT)
Dept: FAMILY MEDICINE CLINIC | Facility: CLINIC | Age: 82
End: 2017-08-18

## 2017-08-18 RX ORDER — DIGOXIN 125 MCG
125 TABLET ORAL
Qty: 90 TABLET | Refills: 2
Start: 2017-08-18 | End: 2018-05-14 | Stop reason: SDUPTHER

## 2017-08-18 RX ORDER — CARVEDILOL 3.12 MG/1
3.12 TABLET ORAL 2 TIMES DAILY WITH MEALS
Qty: 180 TABLET | Refills: 2
Start: 2017-08-18 | End: 2018-05-14 | Stop reason: SDUPTHER

## 2017-08-18 NOTE — TELEPHONE ENCOUNTER
----- Message from May Martin MD sent at 8/18/2017  8:00 AM CDT -----  Labs were ok.  No significant findings.

## 2017-08-28 ENCOUNTER — OFFICE VISIT (OUTPATIENT)
Dept: FAMILY MEDICINE CLINIC | Facility: CLINIC | Age: 82
End: 2017-08-28

## 2017-08-28 VITALS
HEIGHT: 64 IN | DIASTOLIC BLOOD PRESSURE: 70 MMHG | OXYGEN SATURATION: 90 % | WEIGHT: 168 LBS | BODY MASS INDEX: 28.68 KG/M2 | SYSTOLIC BLOOD PRESSURE: 122 MMHG | TEMPERATURE: 96.2 F | HEART RATE: 85 BPM

## 2017-08-28 DIAGNOSIS — R60.0 BILATERAL LOWER EXTREMITY EDEMA: Primary | ICD-10-CM

## 2017-08-28 PROCEDURE — 99213 OFFICE O/P EST LOW 20 MIN: CPT | Performed by: FAMILY MEDICINE

## 2017-08-28 RX ORDER — BUSPIRONE HYDROCHLORIDE 10 MG/1
TABLET ORAL
Qty: 180 TABLET | Refills: 2 | Status: SHIPPED | OUTPATIENT
Start: 2017-08-28 | End: 2018-05-14 | Stop reason: SDUPTHER

## 2017-08-28 RX ORDER — METOLAZONE 5 MG/1
TABLET ORAL
Qty: 5 TABLET | Refills: 0 | Status: SHIPPED | OUTPATIENT
Start: 2017-08-28 | End: 2018-05-14

## 2017-08-28 NOTE — PROGRESS NOTES
"Subjective   Chief Complaint   Patient presents with   • Cellulitis     2 week follow up     Gavi Gordon is a 83 y.o. female.   Cellulitis (2 week follow up)    History of Present Illness     Presents for a recheck on left lower extremity cellulitis and edema    Lower extremity cellulits has resolved  Home health nurse completed visits last week  Last call to the patient was 8/26 and told patient to unwrap leg ad follow up with primary care  Swelling has improved  Pain resolved  Edema remains persistent but has improved  Patient elevating lower legs during the day and at night  Cannot tolerate compression stockings but wearing knee high stockings    The following portions of the patient's history were reviewed and updated as appropriate: allergies, current medications, past family history, past medical history, past social history, past surgical history and problem list.    Review of Systems   Constitutional: Negative for appetite change, chills, fatigue and fever.   HENT: Negative for congestion, ear pain, rhinorrhea and sore throat.    Eyes: Negative for pain.   Respiratory: Negative for cough and shortness of breath.    Cardiovascular: Positive for leg swelling. Negative for chest pain and palpitations.   Gastrointestinal: Negative for abdominal pain, constipation, diarrhea and nausea.   Genitourinary: Negative for dysuria.   Musculoskeletal: Negative for back pain, joint swelling and neck pain.   Skin: Negative for rash.   Neurological: Negative for dizziness and headaches.       Objective   /70  Pulse 85  Temp 96.2 °F (35.7 °C)  Ht 64\" (162.6 cm)  Wt 168 lb (76.2 kg)  SpO2 90%  BMI 28.84 kg/m2  Physical Exam   Constitutional: She is oriented to person, place, and time. She appears well-developed and well-nourished.   HENT:   Head: Normocephalic and atraumatic.   Eyes: Pupils are equal, round, and reactive to light.   Neck: Normal range of motion. Neck supple.   Cardiovascular: Normal rate, " regular rhythm and normal heart sounds.    2+ pitting edema of the left lower extremity   Pulmonary/Chest: Effort normal and breath sounds normal. No respiratory distress. She has no wheezes. She has no rales.   Abdominal: Soft. Bowel sounds are normal.   Musculoskeletal: Normal range of motion.   Neurological: She is alert and oriented to person, place, and time.   Skin: Skin is warm and dry.   Psychiatric: She has a normal mood and affect.   Nursing note and vitals reviewed.      Assessment/Plan   Problems Addressed this Visit        Other    Bilateral lower extremity edema - Primary        Start metolazone x 5 days prior to first lasix dose  Stressed elevating lower leg - above level of heart for help with edema  Limit sodium intake  Return in 1 week for a recheck

## 2017-09-28 RX ORDER — ALENDRONATE SODIUM 70 MG/1
TABLET ORAL
Qty: 12 TABLET | Refills: 3 | Status: SHIPPED | OUTPATIENT
Start: 2017-09-28 | End: 2018-05-14 | Stop reason: SDUPTHER

## 2017-09-29 ENCOUNTER — OFFICE VISIT (OUTPATIENT)
Dept: FAMILY MEDICINE CLINIC | Facility: CLINIC | Age: 82
End: 2017-09-29

## 2017-09-29 ENCOUNTER — LAB (OUTPATIENT)
Dept: LAB | Facility: OTHER | Age: 82
End: 2017-09-29

## 2017-09-29 ENCOUNTER — TELEPHONE (OUTPATIENT)
Dept: FAMILY MEDICINE CLINIC | Facility: CLINIC | Age: 82
End: 2017-09-29

## 2017-09-29 VITALS
HEART RATE: 77 BPM | TEMPERATURE: 95.9 F | SYSTOLIC BLOOD PRESSURE: 146 MMHG | WEIGHT: 166 LBS | DIASTOLIC BLOOD PRESSURE: 76 MMHG | HEIGHT: 64 IN | OXYGEN SATURATION: 93 % | BODY MASS INDEX: 28.34 KG/M2

## 2017-09-29 DIAGNOSIS — E53.8 B12 DEFICIENCY: ICD-10-CM

## 2017-09-29 DIAGNOSIS — Z23 INFLUENZA VACCINE NEEDED: ICD-10-CM

## 2017-09-29 DIAGNOSIS — L21.9 SEBORRHEIC DERMATITIS OF SCALP: ICD-10-CM

## 2017-09-29 DIAGNOSIS — R41.0 DISORIENTED: Primary | ICD-10-CM

## 2017-09-29 DIAGNOSIS — R41.0 DISORIENTED: ICD-10-CM

## 2017-09-29 LAB
ALBUMIN SERPL-MCNC: 3.1 G/DL (ref 3.2–5.5)
ALBUMIN/GLOB SERPL: 0.9 G/DL (ref 1–3)
ALP SERPL-CCNC: 82 U/L (ref 15–121)
ALT SERPL W P-5'-P-CCNC: 17 U/L (ref 10–60)
ANION GAP SERPL CALCULATED.3IONS-SCNC: 8 MMOL/L (ref 5–15)
AST SERPL-CCNC: 19 U/L (ref 10–60)
BACTERIA UR QL AUTO: ABNORMAL /HPF
BASOPHILS # BLD AUTO: 0.02 10*3/MM3 (ref 0–0.2)
BASOPHILS NFR BLD AUTO: 0.2 % (ref 0–2)
BILIRUB SERPL-MCNC: 1.2 MG/DL (ref 0.2–1)
BILIRUB UR QL STRIP: NEGATIVE
BUN BLD-MCNC: 15 MG/DL (ref 8–25)
BUN/CREAT SERPL: 25 (ref 7–25)
CALCIUM SPEC-SCNC: 9 MG/DL (ref 8.4–10.8)
CHLORIDE SERPL-SCNC: 102 MMOL/L (ref 100–112)
CLARITY UR: ABNORMAL
CO2 SERPL-SCNC: 30 MMOL/L (ref 20–32)
COD CRY URNS QL: ABNORMAL /HPF
COLOR UR: ABNORMAL
CREAT BLD-MCNC: 0.6 MG/DL (ref 0.4–1.3)
DEPRECATED RDW RBC AUTO: 55.4 FL (ref 36.4–46.3)
EOSINOPHIL # BLD AUTO: 0.55 10*3/MM3 (ref 0–0.7)
EOSINOPHIL NFR BLD AUTO: 5.6 % (ref 0–7)
ERYTHROCYTE [DISTWIDTH] IN BLOOD BY AUTOMATED COUNT: 15.6 % (ref 11.5–14.5)
GFR SERPL CREATININE-BSD FRML MDRD: 95 ML/MIN/1.73 (ref 39–90)
GLOBULIN UR ELPH-MCNC: 3.4 GM/DL (ref 2.5–4.6)
GLUCOSE BLD-MCNC: 101 MG/DL (ref 70–100)
GLUCOSE UR STRIP-MCNC: NEGATIVE MG/DL
GRAN CASTS URNS QL MICRO: ABNORMAL /LPF
HCT VFR BLD AUTO: 38.2 % (ref 35–45)
HGB BLD-MCNC: 11.7 G/DL (ref 12–15.5)
HGB UR QL STRIP.AUTO: NEGATIVE
HYALINE CASTS UR QL AUTO: ABNORMAL /LPF
KETONES UR QL STRIP: ABNORMAL
LEUKOCYTE ESTERASE UR QL STRIP.AUTO: NEGATIVE
LYMPHOCYTES # BLD AUTO: 1.92 10*3/MM3 (ref 0.6–4.2)
LYMPHOCYTES NFR BLD AUTO: 19.6 % (ref 10–50)
MCH RBC QN AUTO: 30.7 PG (ref 26.5–34)
MCHC RBC AUTO-ENTMCNC: 30.6 G/DL (ref 31.4–36)
MCV RBC AUTO: 100.3 FL (ref 80–98)
MONOCYTES # BLD AUTO: 0.65 10*3/MM3 (ref 0–0.9)
MONOCYTES NFR BLD AUTO: 6.6 % (ref 0–12)
MUCOUS THREADS URNS QL MICRO: ABNORMAL /HPF
NEUTROPHILS # BLD AUTO: 6.64 10*3/MM3 (ref 2–8.6)
NEUTROPHILS NFR BLD AUTO: 68 % (ref 37–80)
NITRITE UR QL STRIP: NEGATIVE
PH UR STRIP.AUTO: 5.5 [PH] (ref 5.5–8)
PLATELET # BLD AUTO: 182 10*3/MM3 (ref 150–450)
PMV BLD AUTO: 10.4 FL (ref 8–12)
POTASSIUM BLD-SCNC: 4.4 MMOL/L (ref 3.4–5.4)
PROT SERPL-MCNC: 6.5 G/DL (ref 6.7–8.2)
PROT UR QL STRIP: ABNORMAL
RBC # BLD AUTO: 3.81 10*6/MM3 (ref 3.77–5.16)
RBC # UR: ABNORMAL /HPF
REF LAB TEST METHOD: ABNORMAL
SODIUM BLD-SCNC: 140 MMOL/L (ref 134–146)
SP GR UR STRIP: >=1.03 (ref 1–1.03)
SQUAMOUS #/AREA URNS HPF: ABNORMAL /HPF
UROBILINOGEN UR QL STRIP: ABNORMAL
WBC NRBC COR # BLD: 9.78 10*3/MM3 (ref 3.2–9.8)
WBC UR QL AUTO: ABNORMAL /HPF

## 2017-09-29 PROCEDURE — 80053 COMPREHEN METABOLIC PANEL: CPT | Performed by: FAMILY MEDICINE

## 2017-09-29 PROCEDURE — 81001 URINALYSIS AUTO W/SCOPE: CPT | Performed by: FAMILY MEDICINE

## 2017-09-29 PROCEDURE — 85025 COMPLETE CBC W/AUTO DIFF WBC: CPT | Performed by: FAMILY MEDICINE

## 2017-09-29 PROCEDURE — 90662 IIV NO PRSV INCREASED AG IM: CPT | Performed by: FAMILY MEDICINE

## 2017-09-29 PROCEDURE — 96372 THER/PROPH/DIAG INJ SC/IM: CPT | Performed by: FAMILY MEDICINE

## 2017-09-29 PROCEDURE — 87086 URINE CULTURE/COLONY COUNT: CPT | Performed by: FAMILY MEDICINE

## 2017-09-29 PROCEDURE — 99214 OFFICE O/P EST MOD 30 MIN: CPT | Performed by: FAMILY MEDICINE

## 2017-09-29 PROCEDURE — 36415 COLL VENOUS BLD VENIPUNCTURE: CPT | Performed by: FAMILY MEDICINE

## 2017-09-29 PROCEDURE — G0008 ADMIN INFLUENZA VIRUS VAC: HCPCS | Performed by: FAMILY MEDICINE

## 2017-09-29 RX ADMIN — CYANOCOBALAMIN 1000 MCG: 1000 INJECTION, SOLUTION INTRAMUSCULAR; SUBCUTANEOUS at 11:26

## 2017-09-29 NOTE — PROGRESS NOTES
"Subjective   Chief Complaint   Patient presents with   • B12 Injection   • Flu Vaccine   • feeling disoriented     Gavi Gordon is a 83 y.o. female.   B12 Injection; Flu Vaccine; and feeling disoriented    History of Present Illness     Patient presents with new onset of confusion and feeling disoriented  Occurs every morning  Started 2 weeks ago  Denies any associations  No reported changes with medications  No reported changes in personal life  Her sister admits that family members have moved into the patient's home and she has worsened anxiety due to this    Asking for flu vaccine    Asking for B12 IM today    C/o pruritic scalp  Located at the back of her scalp    The following portions of the patient's history were reviewed and updated as appropriate: allergies, current medications, past family history, past medical history, past social history, past surgical history and problem list.    Review of Systems   Constitutional: Negative for appetite change, chills, fatigue and fever.   HENT: Negative for congestion, ear pain, rhinorrhea and sore throat.    Eyes: Negative for pain.   Respiratory: Negative for cough and shortness of breath.    Cardiovascular: Negative for chest pain and palpitations.   Gastrointestinal: Negative for abdominal pain, constipation, diarrhea and nausea.   Genitourinary: Negative for dysuria.   Musculoskeletal: Negative for back pain, joint swelling and neck pain.   Skin: Negative for rash.        Pruritic scalp   Neurological: Negative for dizziness and headaches.   Psychiatric/Behavioral: Positive for confusion.       Objective   /76  Pulse 77  Temp 95.9 °F (35.5 °C)  Ht 64\" (162.6 cm)  Wt 166 lb (75.3 kg)  SpO2 93%  BMI 28.49 kg/m2  Physical Exam   Constitutional: She is oriented to person, place, and time. She appears well-developed and well-nourished.   HENT:   Head: Normocephalic and atraumatic.   Eyes: Pupils are equal, round, and reactive to light.   Neck: Normal " range of motion. Neck supple.   Cardiovascular: Normal rate, regular rhythm and normal heart sounds.    Pulmonary/Chest: She is in respiratory distress. She has decreased breath sounds. She has wheezes. She has no rales.   NC in place, portable oxygen  Inhalation - accessory muscle wasting and use with labored breathing - stable   Abdominal: Soft. Bowel sounds are normal.   Musculoskeletal: Normal range of motion.   Neurological: She is alert and oriented to person, place, and time.   Skin: Skin is warm and dry.   Dry scalp    Psychiatric: She has a normal mood and affect.   Nursing note and vitals reviewed.      Assessment/Plan   Problems Addressed this Visit        Digestive    B12 deficiency - Primary      Other Visit Diagnoses     Disoriented        Relevant Orders    Comprehensive Metabolic Panel    CBC & Differential    Urinalysis With / Culture If Indicated - Urine, Clean Catch    XR Chest 2 View    Influenza vaccine needed        Relevant Orders    Flu Vaccine High Dose PF 65YR+    Seborrheic dermatitis of scalp        Relevant Medications    pyrithione zinc (HEAD AND SHOULDERS) 1 % shampoo        Labs ordered    cxr ordered    b12 today    Flu vaccine today    Start log for symptoms    Start shampoo for scalp    Recheck in 1 week for MME

## 2017-09-29 NOTE — TELEPHONE ENCOUNTER
----- Message from May Martin MD sent at 9/29/2017 12:43 PM CDT -----  Protein level is below normal - recommend to increase protein in diet.  Urine - negative for infection

## 2017-10-01 LAB — BACTERIA SPEC AEROBE CULT: NORMAL

## 2017-10-23 RX ORDER — SPIRONOLACTONE 50 MG/1
TABLET, FILM COATED ORAL
Qty: 90 TABLET | Refills: 1 | Status: SHIPPED | OUTPATIENT
Start: 2017-10-23 | End: 2018-04-21 | Stop reason: SDUPTHER

## 2017-10-23 RX ORDER — DONEPEZIL HYDROCHLORIDE 10 MG/1
TABLET, FILM COATED ORAL
Qty: 90 TABLET | Refills: 1 | Status: SHIPPED | OUTPATIENT
Start: 2017-10-23 | End: 2018-04-21 | Stop reason: SDUPTHER

## 2017-10-24 RX ORDER — LEVOTHYROXINE SODIUM 0.05 MG/1
TABLET ORAL
Qty: 90 TABLET | Refills: 0 | Status: SHIPPED | OUTPATIENT
Start: 2017-10-24 | End: 2018-01-04 | Stop reason: SDUPTHER

## 2017-11-09 RX ORDER — LORAZEPAM 0.5 MG/1
TABLET ORAL
Qty: 90 TABLET | Refills: 2 | OUTPATIENT
Start: 2017-11-09

## 2017-11-27 RX ORDER — ATORVASTATIN CALCIUM 80 MG/1
TABLET, FILM COATED ORAL
Qty: 90 TABLET | Refills: 3 | Status: SHIPPED | OUTPATIENT
Start: 2017-11-27 | End: 2018-05-14 | Stop reason: SDUPTHER

## 2017-11-27 RX ORDER — ISOSORBIDE MONONITRATE 60 MG/1
TABLET, EXTENDED RELEASE ORAL
Qty: 90 TABLET | Refills: 3 | Status: SHIPPED | OUTPATIENT
Start: 2017-11-27 | End: 2018-05-14 | Stop reason: SDUPTHER

## 2017-11-27 RX ORDER — CELECOXIB 200 MG/1
CAPSULE ORAL
Qty: 90 CAPSULE | Refills: 3 | Status: SHIPPED | OUTPATIENT
Start: 2017-11-27 | End: 2018-05-14 | Stop reason: SDUPTHER

## 2017-12-05 ENCOUNTER — TRANSCRIBE ORDERS (OUTPATIENT)
Dept: GENERAL RADIOLOGY | Facility: CLINIC | Age: 82
End: 2017-12-05

## 2017-12-05 DIAGNOSIS — J44.9 OBSTRUCTIVE CHRONIC BRONCHITIS WITHOUT EXACERBATION (HCC): Primary | ICD-10-CM

## 2018-01-04 RX ORDER — LORAZEPAM 0.5 MG/1
0.5 TABLET ORAL EVERY 8 HOURS PRN
Qty: 90 TABLET | Refills: 5 | Status: SHIPPED | OUTPATIENT
Start: 2018-01-04 | End: 2018-01-22 | Stop reason: SDUPTHER

## 2018-01-04 RX ORDER — LEVOTHYROXINE SODIUM 0.05 MG/1
50 TABLET ORAL DAILY
Qty: 90 TABLET | Refills: 0 | Status: SHIPPED | OUTPATIENT
Start: 2018-01-04 | End: 2018-01-22 | Stop reason: SDUPTHER

## 2018-01-04 RX ORDER — BENZONATATE 200 MG/1
CAPSULE ORAL
Qty: 30 CAPSULE | Refills: 0 | Status: SHIPPED | OUTPATIENT
Start: 2018-01-04 | End: 2018-05-14

## 2018-01-22 RX ORDER — LEVOTHYROXINE SODIUM 0.05 MG/1
TABLET ORAL
Qty: 90 TABLET | Refills: 0 | Status: SHIPPED | OUTPATIENT
Start: 2018-01-22 | End: 2018-05-30 | Stop reason: SDUPTHER

## 2018-01-22 RX ORDER — LORAZEPAM 0.5 MG/1
0.5 TABLET ORAL EVERY 8 HOURS PRN
Qty: 270 TABLET | Refills: 0 | Status: SHIPPED | OUTPATIENT
Start: 2018-01-22 | End: 2018-05-14 | Stop reason: SDUPTHER

## 2018-02-23 RX ORDER — FUROSEMIDE 40 MG/1
TABLET ORAL
Qty: 180 TABLET | Refills: 3 | Status: SHIPPED | OUTPATIENT
Start: 2018-02-23 | End: 2018-08-13 | Stop reason: SDUPTHER

## 2018-04-12 RX ORDER — ALENDRONATE SODIUM 70 MG/1
TABLET ORAL
Qty: 12 TABLET | Refills: 3 | Status: SHIPPED | OUTPATIENT
Start: 2018-04-12 | End: 2018-08-30 | Stop reason: SDUPTHER

## 2018-04-17 RX ORDER — VENLAFAXINE HYDROCHLORIDE 75 MG/1
CAPSULE, EXTENDED RELEASE ORAL
Qty: 240 CAPSULE | Refills: 3 | Status: SHIPPED | OUTPATIENT
Start: 2018-04-17 | End: 2019-03-20 | Stop reason: SDUPTHER

## 2018-04-23 RX ORDER — DONEPEZIL HYDROCHLORIDE 10 MG/1
TABLET, FILM COATED ORAL
Qty: 90 TABLET | Refills: 1 | Status: SHIPPED | OUTPATIENT
Start: 2018-04-23 | End: 2018-10-18 | Stop reason: SDUPTHER

## 2018-04-23 RX ORDER — SPIRONOLACTONE 50 MG/1
TABLET, FILM COATED ORAL
Qty: 90 TABLET | Refills: 1 | Status: SHIPPED | OUTPATIENT
Start: 2018-04-23 | End: 2018-11-01 | Stop reason: SDUPTHER

## 2018-05-07 RX ORDER — OMEPRAZOLE 20 MG/1
CAPSULE, DELAYED RELEASE ORAL
Qty: 180 CAPSULE | Refills: 3 | Status: SHIPPED | OUTPATIENT
Start: 2018-05-07 | End: 2019-05-02 | Stop reason: SDUPTHER

## 2018-05-11 ENCOUNTER — TELEPHONE (OUTPATIENT)
Dept: FAMILY MEDICINE CLINIC | Facility: CLINIC | Age: 83
End: 2018-05-11

## 2018-05-11 NOTE — TELEPHONE ENCOUNTER
"The patient called me very upset and demanded to see Dr Martin today for some Ativan. The patient told me she was having a lot of \"pain\" and \"anxiety\".  I explained to the patient she last saw Dr Martin on 09/27/2017 and she would need an appt for that type of refill as it is a controlled medication.   I offered our next available appt and the patient advised it was not soon enough. The patient advised she was alone and not able to drive. I called the patients emergency contact, her sister, and asked her to go check on her. She was refusing to call 911 and she may need a ride to the ER.   The sister was going to her home to check on her.       "

## 2018-05-14 ENCOUNTER — OFFICE VISIT (OUTPATIENT)
Dept: FAMILY MEDICINE CLINIC | Facility: CLINIC | Age: 83
End: 2018-05-14

## 2018-05-14 VITALS
OXYGEN SATURATION: 95 % | SYSTOLIC BLOOD PRESSURE: 124 MMHG | DIASTOLIC BLOOD PRESSURE: 76 MMHG | HEART RATE: 66 BPM | HEIGHT: 64 IN | TEMPERATURE: 97.1 F

## 2018-05-14 DIAGNOSIS — R60.0 BILATERAL LOWER EXTREMITY EDEMA: ICD-10-CM

## 2018-05-14 DIAGNOSIS — I47.1 SUPRAVENTRICULAR TACHYCARDIA (HCC): ICD-10-CM

## 2018-05-14 DIAGNOSIS — E03.9 ACQUIRED HYPOTHYROIDISM: ICD-10-CM

## 2018-05-14 DIAGNOSIS — F41.1 GAD (GENERALIZED ANXIETY DISORDER): ICD-10-CM

## 2018-05-14 DIAGNOSIS — M15.9 PRIMARY OSTEOARTHRITIS INVOLVING MULTIPLE JOINTS: ICD-10-CM

## 2018-05-14 DIAGNOSIS — I10 ESSENTIAL HYPERTENSION: ICD-10-CM

## 2018-05-14 DIAGNOSIS — J44.9 CHRONIC OBSTRUCTIVE PULMONARY DISEASE, UNSPECIFIED COPD TYPE (HCC): Primary | ICD-10-CM

## 2018-05-14 DIAGNOSIS — K21.9 GASTROESOPHAGEAL REFLUX DISEASE WITHOUT ESOPHAGITIS: ICD-10-CM

## 2018-05-14 DIAGNOSIS — R41.3 MEMORY IMPAIRMENT: ICD-10-CM

## 2018-05-14 DIAGNOSIS — Z99.81 SUPPLEMENTAL OXYGEN DEPENDENT: ICD-10-CM

## 2018-05-14 PROCEDURE — 99214 OFFICE O/P EST MOD 30 MIN: CPT | Performed by: FAMILY MEDICINE

## 2018-05-14 RX ORDER — LORAZEPAM 0.5 MG/1
0.5 TABLET ORAL EVERY 8 HOURS PRN
Qty: 270 TABLET | Refills: 0 | Status: SHIPPED | OUTPATIENT
Start: 2018-05-14 | End: 2018-08-13 | Stop reason: SDUPTHER

## 2018-05-14 RX ORDER — CLOPIDOGREL BISULFATE 75 MG/1
75 TABLET ORAL DAILY
Qty: 90 TABLET | Refills: 1 | Status: SHIPPED | OUTPATIENT
Start: 2018-05-14 | End: 2018-11-10 | Stop reason: SDUPTHER

## 2018-05-14 RX ORDER — POTASSIUM CHLORIDE 750 MG/1
10 TABLET, FILM COATED, EXTENDED RELEASE ORAL DAILY
Qty: 90 TABLET | Refills: 1 | Status: SHIPPED | OUTPATIENT
Start: 2018-05-14 | End: 2018-10-24 | Stop reason: SDUPTHER

## 2018-05-14 RX ORDER — CARVEDILOL 3.12 MG/1
3.12 TABLET ORAL 2 TIMES DAILY WITH MEALS
Qty: 180 TABLET | Refills: 2
Start: 2018-05-14 | End: 2019-06-24 | Stop reason: SDUPTHER

## 2018-05-14 RX ORDER — ATORVASTATIN CALCIUM 80 MG/1
80 TABLET, FILM COATED ORAL NIGHTLY
Qty: 90 TABLET | Refills: 1 | Status: SHIPPED | OUTPATIENT
Start: 2018-05-14 | End: 2018-11-10 | Stop reason: SDUPTHER

## 2018-05-14 RX ORDER — DIGOXIN 125 MCG
125 TABLET ORAL
Qty: 90 TABLET | Refills: 1 | Status: SHIPPED | OUTPATIENT
Start: 2018-05-14 | End: 2018-11-10 | Stop reason: SDUPTHER

## 2018-05-14 RX ORDER — ISOSORBIDE MONONITRATE 60 MG/1
60 TABLET, EXTENDED RELEASE ORAL DAILY
Qty: 90 TABLET | Refills: 3 | Status: SHIPPED | OUTPATIENT
Start: 2018-05-14 | End: 2019-05-11 | Stop reason: SDUPTHER

## 2018-05-14 RX ORDER — RANITIDINE 150 MG/1
150 TABLET ORAL 2 TIMES DAILY PRN
Qty: 180 TABLET | Refills: 1 | Status: SHIPPED | OUTPATIENT
Start: 2018-05-14 | End: 2018-10-24 | Stop reason: SDUPTHER

## 2018-05-14 RX ORDER — CELECOXIB 200 MG/1
200 CAPSULE ORAL DAILY
Qty: 90 CAPSULE | Refills: 1 | Status: SHIPPED | OUTPATIENT
Start: 2018-05-14 | End: 2018-11-10 | Stop reason: SDUPTHER

## 2018-05-14 RX ORDER — BUSPIRONE HYDROCHLORIDE 10 MG/1
10 TABLET ORAL 2 TIMES DAILY PRN
Qty: 180 TABLET | Refills: 1 | Status: SHIPPED | OUTPATIENT
Start: 2018-05-14 | End: 2018-11-10 | Stop reason: SDUPTHER

## 2018-05-14 RX ORDER — MEMANTINE HYDROCHLORIDE 28 MG/1
28 CAPSULE, EXTENDED RELEASE ORAL DAILY
Qty: 90 CAPSULE | Refills: 1 | Status: SHIPPED | OUTPATIENT
Start: 2018-05-14 | End: 2018-11-10 | Stop reason: SDUPTHER

## 2018-05-14 NOTE — PROGRESS NOTES
"Subjective   Chief Complaint   Patient presents with   • Follow-up     8 months   • Med Refill     all medications   • wants pulmonary Dr. Gavi Gordon is a 83 y.o. female.   Follow-up (8 months); Med Refill (all medications); and wants pulmonary     History of Present Illness     CC- as above - medication refills, pulmonary referral, follow up and due for a refill on her controlled medication for her anxiety also as documented below.    Presents today for medication refills  Last office visit was over 8 months ago    Asking about a pulmonary referral since Dr Conde is leaving in July    Due for annual labs    Due for ativan refill    Due for medical necessity for oxygen dependence    The following portions of the patient's history were reviewed and updated as appropriate: allergies, current medications, past family history, past medical history, past social history, past surgical history and problem list.    Review of Systems   Constitutional: Negative for appetite change, chills, fatigue and fever.   HENT: Negative for congestion, ear pain, rhinorrhea and sore throat.    Eyes: Negative for pain.   Respiratory: Negative for cough and shortness of breath.    Cardiovascular: Negative for chest pain and palpitations.   Gastrointestinal: Negative for abdominal pain, constipation, diarrhea and nausea.   Genitourinary: Negative for dysuria.   Musculoskeletal: Negative for back pain, joint swelling and neck pain.   Skin: Negative for rash.   Neurological: Negative for dizziness and headaches.     Objective   /76   Pulse 66   Temp 97.1 °F (36.2 °C)   Ht 162.6 cm (64.02\")   SpO2 95%   Physical Exam   Constitutional: She is oriented to person, place, and time. She appears well-developed and well-nourished.   HENT:   Head: Normocephalic and atraumatic.   Eyes: Pupils are equal, round, and reactive to light.   Neck: Normal range of motion. Neck supple.   Cardiovascular: Normal rate, regular rhythm and normal " heart sounds.    Pulmonary/Chest: Effort normal. No respiratory distress. She has decreased breath sounds. She has no wheezes. She has no rales.   NC and portable oxygen with patient   Abdominal: Soft. Bowel sounds are normal.   Neurological: She is alert and oriented to person, place, and time.   Skin: Skin is warm and dry.   Psychiatric: She has a normal mood and affect.   Nursing note and vitals reviewed.      Assessment/Plan   Problems Addressed this Visit        Cardiovascular and Mediastinum    Supraventricular tachycardia    Relevant Medications    clopidogrel (PLAVIX) 75 MG tablet    digoxin (LANOXIN) 125 MCG tablet    carvedilol (COREG) 3.125 MG tablet    isosorbide mononitrate (IMDUR) 60 MG 24 hr tablet    Hypertension    Relevant Medications    carvedilol (COREG) 3.125 MG tablet       Respiratory    Chronic obstructive pulmonary disease - Primary    Relevant Medications    fluticasone-salmeterol (ADVAIR) 500-50 MCG/DOSE DISKUS       Digestive    Gastroesophageal reflux disease without esophagitis    Relevant Medications    raNITIdine (ZANTAC) 150 MG tablet       Endocrine    Acquired hypothyroidism    Relevant Medications    carvedilol (COREG) 3.125 MG tablet    Other Relevant Orders    CBC & Differential    Comprehensive Metabolic Panel    Lipid panel    TSH    T4, Free       Musculoskeletal and Integument    Primary osteoarthritis involving multiple joints    Relevant Medications    celecoxib (CeleBREX) 200 MG capsule       Other    Bilateral lower extremity edema    Memory impairment    Relevant Medications    memantine (NAMENDA XR) 28 MG capsule sustained-release 24 hr extended release capsule    Supplemental oxygen dependent    VARSHA (generalized anxiety disorder)    Relevant Medications    memantine (NAMENDA XR) 28 MG capsule sustained-release 24 hr extended release capsule    LORazepam (ATIVAN) 0.5 MG tablet    busPIRone (BUSPAR) 10 MG tablet        Refilled medications    Oxygen measurements done in  office  At rest no oxygen 84%  Ambulate without oxygen 68%  Ambulate with oxygen 71%  At rest with oxygen 95%    Labs ordered    Due for a wellness visit    Recheck in 3 months for a wellness visit

## 2018-05-16 ENCOUNTER — TELEPHONE (OUTPATIENT)
Dept: FAMILY MEDICINE CLINIC | Facility: CLINIC | Age: 83
End: 2018-05-16

## 2018-05-16 NOTE — TELEPHONE ENCOUNTER
Clinic pharmacy [facility] called and said that the pt is requesting 1 month of advair  Until her mail order comes in. Pharmacist florian that I had to call [facility] to ok the refill. I called them and talked to Judie. She said that her advair would be approved. And that she will call [facility] and tell them. The number I called is [phone]. 5/16/18

## 2018-05-22 ENCOUNTER — EPISODE CHANGES (OUTPATIENT)
Dept: CASE MANAGEMENT | Facility: OTHER | Age: 83
End: 2018-05-22

## 2018-05-30 ENCOUNTER — TELEPHONE (OUTPATIENT)
Dept: FAMILY MEDICINE CLINIC | Facility: CLINIC | Age: 83
End: 2018-05-30

## 2018-05-30 ENCOUNTER — LAB (OUTPATIENT)
Dept: LAB | Facility: OTHER | Age: 83
End: 2018-05-30

## 2018-05-30 DIAGNOSIS — E03.9 ACQUIRED HYPOTHYROIDISM: ICD-10-CM

## 2018-05-30 LAB
ALBUMIN SERPL-MCNC: 3.2 G/DL (ref 3.2–5.5)
ALBUMIN/GLOB SERPL: 1 G/DL (ref 1–3)
ALP SERPL-CCNC: 78 U/L (ref 15–121)
ALT SERPL W P-5'-P-CCNC: 23 U/L (ref 10–60)
ANION GAP SERPL CALCULATED.3IONS-SCNC: 5 MMOL/L (ref 5–15)
AST SERPL-CCNC: 21 U/L (ref 10–60)
BASOPHILS # BLD AUTO: 0.03 10*3/MM3 (ref 0–0.2)
BASOPHILS NFR BLD AUTO: 0.4 % (ref 0–2)
BILIRUB SERPL-MCNC: 1 MG/DL (ref 0.2–1)
BUN BLD-MCNC: 14 MG/DL (ref 8–25)
BUN/CREAT SERPL: 23.3 (ref 7–25)
CALCIUM SPEC-SCNC: 8.4 MG/DL (ref 8.4–10.8)
CHLORIDE SERPL-SCNC: 103 MMOL/L (ref 100–112)
CHOLEST SERPL-MCNC: 116 MG/DL (ref 150–200)
CO2 SERPL-SCNC: 34 MMOL/L (ref 20–32)
CREAT BLD-MCNC: 0.6 MG/DL (ref 0.4–1.3)
DEPRECATED RDW RBC AUTO: 54 FL (ref 36.4–46.3)
EOSINOPHIL # BLD AUTO: 0.51 10*3/MM3 (ref 0–0.7)
EOSINOPHIL NFR BLD AUTO: 6.3 % (ref 0–7)
ERYTHROCYTE [DISTWIDTH] IN BLOOD BY AUTOMATED COUNT: 14.4 % (ref 11.5–14.5)
GFR SERPL CREATININE-BSD FRML MDRD: 95 ML/MIN/1.73 (ref 39–90)
GLOBULIN UR ELPH-MCNC: 3.2 GM/DL (ref 2.5–4.6)
GLUCOSE BLD-MCNC: 96 MG/DL (ref 70–100)
HCT VFR BLD AUTO: 41.4 % (ref 35–45)
HDLC SERPL-MCNC: 64 MG/DL (ref 35–100)
HGB BLD-MCNC: 12.6 G/DL (ref 12–15.5)
LDLC SERPL CALC-MCNC: 46 MG/DL
LDLC/HDLC SERPL: 0.72 {RATIO}
LYMPHOCYTES # BLD AUTO: 1.86 10*3/MM3 (ref 0.6–4.2)
LYMPHOCYTES NFR BLD AUTO: 22.8 % (ref 10–50)
MCH RBC QN AUTO: 31.6 PG (ref 26.5–34)
MCHC RBC AUTO-ENTMCNC: 30.4 G/DL (ref 31.4–36)
MCV RBC AUTO: 103.8 FL (ref 80–98)
MONOCYTES # BLD AUTO: 0.66 10*3/MM3 (ref 0–0.9)
MONOCYTES NFR BLD AUTO: 8.1 % (ref 0–12)
NEUTROPHILS # BLD AUTO: 5.1 10*3/MM3 (ref 2–8.6)
NEUTROPHILS NFR BLD AUTO: 62.4 % (ref 37–80)
PLATELET # BLD AUTO: 170 10*3/MM3 (ref 150–450)
PMV BLD AUTO: 10.6 FL (ref 8–12)
POTASSIUM BLD-SCNC: 4.2 MMOL/L (ref 3.4–5.4)
PROT SERPL-MCNC: 6.4 G/DL (ref 6.7–8.2)
RBC # BLD AUTO: 3.99 10*6/MM3 (ref 3.77–5.16)
SODIUM BLD-SCNC: 142 MMOL/L (ref 134–146)
T4 FREE SERPL-MCNC: 0.87 NG/DL (ref 0.78–2.19)
TRIGL SERPL-MCNC: 29 MG/DL (ref 35–160)
TSH SERPL DL<=0.05 MIU/L-ACNC: 1.21 MIU/ML (ref 0.46–4.68)
VLDLC SERPL-MCNC: 5.8 MG/DL
WBC NRBC COR # BLD: 8.16 10*3/MM3 (ref 3.2–9.8)

## 2018-05-30 PROCEDURE — 85025 COMPLETE CBC W/AUTO DIFF WBC: CPT | Performed by: FAMILY MEDICINE

## 2018-05-30 PROCEDURE — 84443 ASSAY THYROID STIM HORMONE: CPT | Performed by: FAMILY MEDICINE

## 2018-05-30 PROCEDURE — 36415 COLL VENOUS BLD VENIPUNCTURE: CPT | Performed by: FAMILY MEDICINE

## 2018-05-30 PROCEDURE — 80053 COMPREHEN METABOLIC PANEL: CPT | Performed by: FAMILY MEDICINE

## 2018-05-30 PROCEDURE — 84439 ASSAY OF FREE THYROXINE: CPT | Performed by: FAMILY MEDICINE

## 2018-05-30 PROCEDURE — 80061 LIPID PANEL: CPT | Performed by: FAMILY MEDICINE

## 2018-05-30 RX ORDER — LEVOTHYROXINE SODIUM 0.05 MG/1
50 TABLET ORAL DAILY
Qty: 90 TABLET | Refills: 0 | Status: SHIPPED | OUTPATIENT
Start: 2018-05-30 | End: 2018-08-13 | Stop reason: SDUPTHER

## 2018-05-30 NOTE — TELEPHONE ENCOUNTER
----- Message from May Martin MD sent at 5/30/2018  3:33 PM CDT -----  Thyroid labs are normal - refilled synthroid and sent this to mail order

## 2018-08-13 ENCOUNTER — OFFICE VISIT (OUTPATIENT)
Dept: FAMILY MEDICINE CLINIC | Facility: CLINIC | Age: 83
End: 2018-08-13

## 2018-08-13 VITALS
OXYGEN SATURATION: 97 % | DIASTOLIC BLOOD PRESSURE: 68 MMHG | SYSTOLIC BLOOD PRESSURE: 120 MMHG | TEMPERATURE: 98 F | HEART RATE: 78 BPM | HEIGHT: 64 IN

## 2018-08-13 DIAGNOSIS — J44.9 CHRONIC OBSTRUCTIVE PULMONARY DISEASE, UNSPECIFIED COPD TYPE (HCC): ICD-10-CM

## 2018-08-13 DIAGNOSIS — Z23 NEED FOR VACCINATION WITH 13-POLYVALENT PNEUMOCOCCAL CONJUGATE VACCINE: ICD-10-CM

## 2018-08-13 DIAGNOSIS — E03.9 ACQUIRED HYPOTHYROIDISM: ICD-10-CM

## 2018-08-13 DIAGNOSIS — Z00.00 ENCOUNTER FOR MEDICARE ANNUAL WELLNESS EXAM: Primary | ICD-10-CM

## 2018-08-13 DIAGNOSIS — Z23 NEED FOR SHINGLES VACCINE: ICD-10-CM

## 2018-08-13 DIAGNOSIS — I10 ESSENTIAL HYPERTENSION: ICD-10-CM

## 2018-08-13 DIAGNOSIS — Z23 NEED FOR VACCINATION: Primary | ICD-10-CM

## 2018-08-13 DIAGNOSIS — F41.1 GENERALIZED ANXIETY DISORDER: ICD-10-CM

## 2018-08-13 PROCEDURE — G0009 ADMIN PNEUMOCOCCAL VACCINE: HCPCS | Performed by: FAMILY MEDICINE

## 2018-08-13 PROCEDURE — G0438 PPPS, INITIAL VISIT: HCPCS | Performed by: FAMILY MEDICINE

## 2018-08-13 PROCEDURE — 90670 PCV13 VACCINE IM: CPT | Performed by: FAMILY MEDICINE

## 2018-08-13 PROCEDURE — 99214 OFFICE O/P EST MOD 30 MIN: CPT | Performed by: FAMILY MEDICINE

## 2018-08-13 RX ORDER — LORAZEPAM 0.5 MG/1
0.5 TABLET ORAL EVERY 8 HOURS PRN
Qty: 270 TABLET | Refills: 0 | Status: SHIPPED | OUTPATIENT
Start: 2018-08-13 | End: 2018-11-08 | Stop reason: SDUPTHER

## 2018-08-13 RX ORDER — FUROSEMIDE 40 MG/1
40 TABLET ORAL 2 TIMES DAILY
Qty: 180 TABLET | Refills: 1 | Status: SHIPPED | OUTPATIENT
Start: 2018-08-13 | End: 2019-02-09 | Stop reason: SDUPTHER

## 2018-08-13 RX ORDER — LEVOTHYROXINE SODIUM 0.05 MG/1
50 TABLET ORAL DAILY
Qty: 90 TABLET | Refills: 0 | Status: SHIPPED | OUTPATIENT
Start: 2018-08-13 | End: 2018-10-23 | Stop reason: SDUPTHER

## 2018-08-13 NOTE — PROGRESS NOTES
QUICK REFERENCE INFORMATION:  The ABCs of the Annual Wellness Visit    Initial Medicare Wellness Visit    HEALTH RISK ASSESSMENT    1934    Recent Hospitalizations:  No hospitalization(s) within the last year..        Current Medical Providers:  Patient Care Team:  May Martin MD as PCP - General (Family Medicine)  Nik Conde MD as PCP - Claims Attributed  Rachel Mcclellan, RN as Care Coordinator (Population Health)        Smoking Status:  History   Smoking Status   • Former Smoker   Smokeless Tobacco   • Never Used       Alcohol Consumption:  History   Alcohol Use No       Depression Screen:   PHQ-2/PHQ-9 Depression Screening 8/13/2018   Little interest or pleasure in doing things 3   Feeling down, depressed, or hopeless 0   Trouble falling or staying asleep, or sleeping too much 0   Feeling tired or having little energy 2   Poor appetite or overeating 0   Feeling bad about yourself - or that you are a failure or have let yourself or your family down 0   Trouble concentrating on things, such as reading the newspaper or watching television 0   Moving or speaking so slowly that other people could have noticed. Or the opposite - being so fidgety or restless that you have been moving around a lot more than usual 0   Thoughts that you would be better off dead, or of hurting yourself in some way 0   Total Score 5   If you checked off any problems, how difficult have these problems made it for you to do your work, take care of things at home, or get along with other people? Not difficult at all       Health Habits and Functional and Cognitive Screening:  Functional & Cognitive Status 8/13/2018   Do you have difficulty preparing food and eating? No   Do you have difficulty bathing yourself, getting dressed or grooming yourself? No   Do you have difficulty using the toilet? No   Do you have difficulty moving around from place to place? Yes   Do you have trouble with steps or getting out of a bed or a chair?  Yes   Current Diet Unhealthy Diet   Dental Exam Up to date   Eye Exam Up to date   Exercise (times per week) 0 times per week   Current Exercise Activities Include None   Do you need help using the phone?  No   Are you deaf or do you have serious difficulty hearing?  No   Do you need help with transportation? Yes   Do you need help shopping? Yes   Do you need help preparing meals?  Yes   Do you need help with housework?  Yes   Do you need help with laundry? Yes   Do you need help taking your medications? Yes   Do you need help managing money? No   Do you ever drive or ride in a car without wearing a seat belt? No   Have you felt unusual stress, anger or loneliness in the last month? Yes   Who do you live with? Other   If you need help, do you have trouble finding someone available to you? No   Have you been bothered in the last four weeks by sexual problems? No   Do you have difficulty concentrating, remembering or making decisions? Yes     Fall Risk Assessment  Fallen in past 6 months: 0--> No  Mental Status: 0--> no mental status change  Mobility: 2--> Requires assistance- transfer, walker, etc.  Medications: 1--> Sedatives  Total Fall Risk Score: 5        Does the patient have evidence of cognitive impairment? No    Asiprin use counseling: On clopidrogel as an alternative (due to ASA contraindication)      Recent Lab Results:    Visual Acuity:  No exam data present    Age-appropriate Screening Schedule:  Refer to the list below for future screening recommendations based on patient's age, sex and/or medical conditions. Orders for these recommended tests are listed in the plan section. The patient has been provided with a written plan.    Health Maintenance   Topic Date Due   • ZOSTER VACCINE (2 of 2) 02/26/2000   • PNEUMOCOCCAL VACCINES (65+ LOW/MEDIUM RISK) (2 of 2 - PPSV23) 10/08/2017   • INFLUENZA VACCINE  08/01/2018   • DXA SCAN  09/09/2018   • LIPID PANEL  05/30/2019   • TDAP/TD VACCINES (2 - Td) 08/14/2026         Subjective   History of Present Illness    Gavi Gordon is a 83 y.o. female who presents for an Annual Wellness Visit.    The following portions of the patient's history were reviewed and updated as appropriate: allergies, current medications, past family history, past medical history, past social history, past surgical history and problem list.    Outpatient Medications Prior to Visit   Medication Sig Dispense Refill   • albuterol (PROAIR RESPICLICK) 108 (90 BASE) MCG/ACT inhaler Inhale 1 puff Every 4 (Four) Hours As Needed for Wheezing. 1 inhaler 12   • albuterol (PROVENTIL) (2.5 MG/3ML) 0.083% nebulizer solution Take 2.5 mg by nebulization 4 (Four) Times a Day. 1 vial 12   • alendronate (FOSAMAX) 70 MG tablet TAKE 1 TABLET BY MOUTH ONCE A WEEK WITH A GLASS OF WATER *DO NOT EAT OR LAY DOWN FOR 30 MINUTES AFTER TAKING* 12 tablet 3   • atorvastatin (LIPITOR) 80 MG tablet Take 1 tablet by mouth Every Night. 90 tablet 1   • busPIRone (BUSPAR) 10 MG tablet Take 1 tablet by mouth 2 (Two) Times a Day As Needed (anxiety). 180 tablet 1   • Calcium Carb-Cholecalciferol 600-800 MG-UNIT tablet Take 1 tablet by mouth 2 (two) times a day.     • carvedilol (COREG) 3.125 MG tablet Take 1 tablet by mouth 2 (Two) Times a Day With Meals. 180 tablet 2   • celecoxib (CeleBREX) 200 MG capsule Take 1 capsule by mouth Daily. 90 capsule 1   • clopidogrel (PLAVIX) 75 MG tablet Take 1 tablet by mouth Daily. 90 tablet 1   • digoxin (LANOXIN) 125 MCG tablet Take 1 tablet by mouth Daily. 90 tablet 1   • diphenoxylate-atropine (LOMOTIL) 2.5-0.025 MG per tablet 1 TABLET BY MOUTH 2 TIMES A  tablet 0   • donepezil (ARICEPT) 10 MG tablet TAKE 1 TABLET EVERY NIGHT 90 tablet 1   • fluticasone (FLONASE) 50 MCG/ACT nasal spray 2 sprays into each nostril Daily. 1 each 3   • fluticasone-salmeterol (ADVAIR) 500-50 MCG/DOSE DISKUS Inhale 1 puff 2 (Two) Times a Day. 180 each 1   • isosorbide mononitrate (IMDUR) 60 MG 24 hr tablet Take 1  tablet by mouth Daily. 90 tablet 3   • memantine (NAMENDA XR) 28 MG capsule sustained-release 24 hr extended release capsule Take 1 capsule by mouth Daily. 90 capsule 1   • montelukast (SINGULAIR) 10 MG tablet Take 1 tablet by mouth Every Night. 90 tablet 0   • Multiple Vitamins-Minerals (CENTRUM PO) Take 1 tablet by mouth daily.     • omeprazole (priLOSEC) 20 MG capsule TAKE 1 CAPSULE TWICE A  capsule 3   • potassium chloride (K-DUR) 10 MEQ CR tablet Take 1 tablet by mouth Daily. 90 tablet 1   • raNITIdine (ZANTAC) 150 MG tablet Take 1 tablet by mouth 2 (Two) Times a Day As Needed for Heartburn or Indigestion. 180 tablet 1   • spironolactone (ALDACTONE) 50 MG tablet TAKE 1 TABLET DAILY 90 tablet 1   • venlafaxine XR (EFFEXOR-XR) 75 MG 24 hr capsule TAKE 2 CAPSULES IN THE MORNING AND 1 CAPSULE AT NIGHT 240 capsule 3   • furosemide (LASIX) 40 MG tablet TAKE 1 TABLET TWICE A  tablet 3   • levothyroxine (SYNTHROID, LEVOTHROID) 50 MCG tablet Take 1 tablet by mouth Daily. 90 tablet 0   • LORazepam (ATIVAN) 0.5 MG tablet Take 1 tablet by mouth Every 8 (Eight) Hours As Needed for Anxiety. 270 tablet 0     Facility-Administered Medications Prior to Visit   Medication Dose Route Frequency Provider Last Rate Last Dose   • cyanocobalamin injection 1,000 mcg  1,000 mcg Intramuscular Q30 Days May Martin MD   1,000 mcg at 09/29/17 1126   • ipratropium-albuterol (DUO-NEB) nebulizer solution 3 mL  3 mL Nebulization Once May Martin MD           Patient Active Problem List   Diagnosis   • Bilateral lower extremity edema   • Muscle spasms of both lower extremities   • Chronic diarrhea   • Memory impairment   • Osteoporosis   • Osteopenia   • Chronic low back pain   • Neuropathy of both upper extremities   • Neuropathy of left upper extremity   • Chronic obstructive pulmonary disease (CMS/HCC)   • Supraventricular tachycardia (CMS/HCC)   • Hypertension   • Angina pectoris (CMS/HCC)   • Spinal  "stenosis of cervical region   • Anterolisthesis   • B12 deficiency   • Gastroesophageal reflux disease without esophagitis   • Perennial allergic rhinitis   • Bilateral edema of lower extremity   • Supplemental oxygen dependent   • Primary osteoarthritis involving multiple joints   • VARSHA (generalized anxiety disorder)   • Acquired hypothyroidism       Advance Care Planning:  has an advance directive - a copy has been provided and is in file    Identification of Risk Factors:  Risk factors include: weight , unhealthy diet and cardiovascular risk.    Review of Systems    Compared to one year ago, the patient feels her physical health is the same.  Compared to one year ago, the patient feels her mental health is the same.    Objective     Physical Exam    Vitals:    08/13/18 1320   BP: 120/68   Pulse: 78   Temp: 98 °F (36.7 °C)   SpO2: 97%   Height: 162.6 cm (64.02\")   PainSc: 0-No pain       Patient's There is no height or weight on file to calculate BMI. BMI is above normal parameters. Recommendations include: exercise counseling and nutrition counseling.      Assessment/Plan   Patient Self-Management and Personalized Health Advice  The patient has been provided with information about: diet, exercise, weight management, prevention of cardiac or vascular disease and fall prevention and preventive services including:   · Advance directive, Counseling for cardiovascular disease risk reduction, Fall Risk assessment done, Fall Risk plan of care done, Nutrition counseling provided, Zostavax vaccine (Herpes Zoster).    Visit Diagnoses:    ICD-10-CM ICD-9-CM   1. Encounter for Medicare annual wellness exam Z00.00 V70.0   2. Need for shingles vaccine Z23 V04.89   3. Chronic obstructive pulmonary disease, unspecified COPD type (CMS/[facility]) J44.9 496   4. Generalized anxiety disorder F41.1 300.02   5. Need for vaccination with 13-polyvalent pneumococcal conjugate vaccine Z23 V03.82   6. Essential hypertension I10 401.9   7. " Acquired hypothyroidism E03.9 244.9       No orders of the defined types were placed in this encounter.      Outpatient Encounter Prescriptions as of 8/13/2018   Medication Sig Dispense Refill   • albuterol (PROAIR RESPICLICK) 108 (90 BASE) MCG/ACT inhaler Inhale 1 puff Every 4 (Four) Hours As Needed for Wheezing. 1 inhaler 12   • albuterol (PROVENTIL) (2.5 MG/3ML) 0.083% nebulizer solution Take 2.5 mg by nebulization 4 (Four) Times a Day. 1 vial 12   • alendronate (FOSAMAX) 70 MG tablet TAKE 1 TABLET BY MOUTH ONCE A WEEK WITH A GLASS OF WATER *DO NOT EAT OR LAY DOWN FOR 30 MINUTES AFTER TAKING* 12 tablet 3   • atorvastatin (LIPITOR) 80 MG tablet Take 1 tablet by mouth Every Night. 90 tablet 1   • busPIRone (BUSPAR) 10 MG tablet Take 1 tablet by mouth 2 (Two) Times a Day As Needed (anxiety). 180 tablet 1   • Calcium Carb-Cholecalciferol 600-800 MG-UNIT tablet Take 1 tablet by mouth 2 (two) times a day.     • carvedilol (COREG) 3.125 MG tablet Take 1 tablet by mouth 2 (Two) Times a Day With Meals. 180 tablet 2   • celecoxib (CeleBREX) 200 MG capsule Take 1 capsule by mouth Daily. 90 capsule 1   • clopidogrel (PLAVIX) 75 MG tablet Take 1 tablet by mouth Daily. 90 tablet 1   • digoxin (LANOXIN) 125 MCG tablet Take 1 tablet by mouth Daily. 90 tablet 1   • diphenoxylate-atropine (LOMOTIL) 2.5-0.025 MG per tablet 1 TABLET BY MOUTH 2 TIMES A  tablet 0   • donepezil (ARICEPT) 10 MG tablet TAKE 1 TABLET EVERY NIGHT 90 tablet 1   • fluticasone (FLONASE) 50 MCG/ACT nasal spray 2 sprays into each nostril Daily. 1 each 3   • fluticasone-salmeterol (ADVAIR) 500-50 MCG/DOSE DISKUS Inhale 1 puff 2 (Two) Times a Day. 180 each 1   • furosemide (LASIX) 40 MG tablet Take 1 tablet by mouth 2 (Two) Times a Day. 180 tablet 1   • isosorbide mononitrate (IMDUR) 60 MG 24 hr tablet Take 1 tablet by mouth Daily. 90 tablet 3   • levothyroxine (SYNTHROID, LEVOTHROID) 50 MCG tablet Take 1 tablet by mouth Daily. 90 tablet 0   • LORazepam  (ATIVAN) 0.5 MG tablet Take 1 tablet by mouth Every 8 (Eight) Hours As Needed for Anxiety. 270 tablet 0   • memantine (NAMENDA XR) 28 MG capsule sustained-release 24 hr extended release capsule Take 1 capsule by mouth Daily. 90 capsule 1   • montelukast (SINGULAIR) 10 MG tablet Take 1 tablet by mouth Every Night. 90 tablet 0   • Multiple Vitamins-Minerals (CENTRUM PO) Take 1 tablet by mouth daily.     • omeprazole (priLOSEC) 20 MG capsule TAKE 1 CAPSULE TWICE A  capsule 3   • potassium chloride (K-DUR) 10 MEQ CR tablet Take 1 tablet by mouth Daily. 90 tablet 1   • raNITIdine (ZANTAC) 150 MG tablet Take 1 tablet by mouth 2 (Two) Times a Day As Needed for Heartburn or Indigestion. 180 tablet 1   • spironolactone (ALDACTONE) 50 MG tablet TAKE 1 TABLET DAILY 90 tablet 1   • venlafaxine XR (EFFEXOR-XR) 75 MG 24 hr capsule TAKE 2 CAPSULES IN THE MORNING AND 1 CAPSULE AT NIGHT 240 capsule 3   • [DISCONTINUED] furosemide (LASIX) 40 MG tablet TAKE 1 TABLET TWICE A  tablet 3   • [DISCONTINUED] levothyroxine (SYNTHROID, LEVOTHROID) 50 MCG tablet Take 1 tablet by mouth Daily. 90 tablet 0   • [DISCONTINUED] LORazepam (ATIVAN) 0.5 MG tablet Take 1 tablet by mouth Every 8 (Eight) Hours As Needed for Anxiety. 270 tablet 0     Facility-Administered Encounter Medications as of 8/13/2018   Medication Dose Route Frequency Provider Last Rate Last Dose   • cyanocobalamin injection 1,000 mcg  1,000 mcg Intramuscular Q30 Days May Martin MD   1,000 mcg at 09/29/17 1126   • [DISCONTINUED] ipratropium-albuterol (DUO-NEB) nebulizer solution 3 mL  3 mL Nebulization Once May Martin MD           Reviewed use of high risk medication in the elderly: yes  Reviewed for potential of harmful drug interactions in the elderly: yes    Follow Up:  Return in about 3 months (around 11/13/2018), or if symptoms worsen or fail to improve.     An After Visit Summary and PPPS with all of these plans were given to the patient.

## 2018-08-13 NOTE — PROGRESS NOTES
"Subjective   Chief Complaint   Patient presents with   • Wellness visit     Gavi Gordon is a 83 y.o. female.   Wellness visit    History of Present Illness     Presents today for a wellness visit    Due for shingles vaccine    Due for pneumococcal vaccine    Hypertension - managed with coreg, lanoxin, aldactone, lasix    Copd - currently stable  Her pulmonologist has retired and the patient is asking about whether she needs to establish with a new provider  Currently managed with advair, albuterol PRN, nebulizer PRN  She additionally has supplemental oxygen at 2LPM by NC    Anxiety - well managed with ativan TID PRN  Due for a medication refill today  Denies sedation or impairment with use    Hypothyroidism - due for synthroid refill  Last tsh was controlled at current dose    Denies any new concerns or complaints    The following portions of the patient's history were reviewed and updated as appropriate: allergies, current medications, past family history, past medical history, past social history, past surgical history and problem list.    Review of Systems   Constitutional: Negative for appetite change, chills, fatigue and fever.   HENT: Negative for congestion, ear pain, rhinorrhea and sore throat.    Eyes: Negative for pain.   Respiratory: Negative for cough and shortness of breath.    Cardiovascular: Negative for chest pain and palpitations.   Gastrointestinal: Negative for abdominal pain, constipation, diarrhea and nausea.   Genitourinary: Negative for dysuria.   Musculoskeletal: Negative for back pain, joint swelling and neck pain.   Skin: Negative for rash.   Neurological: Negative for dizziness and headaches.       Objective   /68   Pulse 78   Temp 98 °F (36.7 °C)   Ht 162.6 cm (64.02\")   SpO2 97% Comment: with oxygen  Physical Exam   Constitutional: She is oriented to person, place, and time. She appears well-developed and well-nourished.   HENT:   Head: Normocephalic and atraumatic.   Eyes: " Pupils are equal, round, and reactive to light.   Neck: Normal range of motion. Neck supple.   Cardiovascular: Normal rate, regular rhythm and normal heart sounds.    Pulmonary/Chest: No respiratory distress. She has decreased breath sounds. She has wheezes. She has no rales.   Portable oxygen by NC with patient today   Abdominal: Soft. Bowel sounds are normal.   Musculoskeletal:   Examined in wheelchair   Neurological: She is alert and oriented to person, place, and time.   Skin: Skin is warm and dry.   Psychiatric: She has a normal mood and affect.   Nursing note and vitals reviewed.      Assessment/Plan   Problems Addressed this Visit        Cardiovascular and Mediastinum    Hypertension    Relevant Medications    furosemide (LASIX) 40 MG tablet       Respiratory    Chronic obstructive pulmonary disease (CMS/HCC)       Endocrine    Acquired hypothyroidism    Relevant Medications    levothyroxine (SYNTHROID, LEVOTHROID) 50 MCG tablet       Other    VARSHA (generalized anxiety disorder)    Relevant Medications    LORazepam (ATIVAN) 0.5 MG tablet      Other Visit Diagnoses     Encounter for Medicare annual wellness exam    -  Primary    Need for shingles vaccine        Need for vaccination with 13-polyvalent pneumococcal conjugate vaccine            Medicare wellness visit today    The patient has read and signed the [facility] Controlled Substance Contract.  I will continue to see patient for regular follow up appointments.  They are well controlled on their medication.  BEVERLY is updated every 3 months. The patient is aware of the potential for addiction and dependence.  Refilled ativan to mail order    Script provided for shingles vaccine    Pneumococcal 13 vaccine provided today    Labs are current    Recheck in 3 months or sooner as needed    Consider referral to pulmonology - if the patient requests

## 2018-08-13 NOTE — PATIENT INSTRUCTIONS
Fall Prevention in the Home  Falls can cause injuries and can affect people from all age groups. There are many simple things that you can do to make your home safe and to help prevent falls.  What can I do on the outside of my home?  · Regularly repair the edges of walkways and driveways and fix any cracks.  · Remove high doorway thresholds.  · Trim any shrubbery on the main path into your home.  · Use bright outdoor lighting.  · Clear walkways of debris and clutter, including tools and rocks.  · Regularly check that handrails are securely fastened and in good repair. Both sides of any steps should have handrails.  · Install guardrails along the edges of any raised decks or porches.  · Have leaves, snow, and ice cleared regularly.  · Use sand or salt on walkways during winter months.  · In the garage, clean up any spills right away, including grease or oil spills.  What can I do in the bathroom?  · Use night lights.  · Install grab bars by the toilet and in the tub and shower. Do not use towel bars as grab bars.  · Use non-skid mats or decals on the floor of the tub or shower.  · If you need to sit down while you are in the shower, use a plastic, non-slip stool.  · Keep the floor dry. Immediately clean up any water that spills on the floor.  · Remove soap buildup in the tub or shower on a regular basis.  · Attach bath mats securely with double-sided non-slip rug tape.  · Remove throw rugs and other tripping hazards from the floor.  What can I do in the bedroom?  · Use night lights.  · Make sure that a bedside light is easy to reach.  · Do not use oversized bedding that drapes onto the floor.  · Have a firm chair that has side arms to use for getting dressed.  · Remove throw rugs and other tripping hazards from the floor.  What can I do in the kitchen?  · Clean up any spills right away.  · Avoid walking on wet floors.  · Place frequently used items in easy-to-reach places.  · If you need to reach for something above  you, use a sturdy step stool that has a grab bar.  · Keep electrical cables out of the way.  · Do not use floor polish or wax that makes floors slippery. If you have to use wax, make sure that it is non-skid floor wax.  · Remove throw rugs and other tripping hazards from the floor.  What can I do in the stairways?  · Do not leave any items on the stairs.  · Make sure that there are handrails on both sides of the stairs. Fix handrails that are broken or loose. Make sure that handrails are as long as the stairways.  · Check any carpeting to make sure that it is firmly attached to the stairs. Fix any carpet that is loose or worn.  · Avoid having throw rugs at the top or bottom of stairways, or secure the rugs with carpet tape to prevent them from moving.  · Make sure that you have a light switch at the top of the stairs and the bottom of the stairs. If you do not have them, have them installed.  What are some other fall prevention tips?  · Wear closed-toe shoes that fit well and support your feet. Wear shoes that have rubber soles or low heels.  · When you use a stepladder, make sure that it is completely opened and that the sides are firmly locked. Have someone hold the ladder while you are using it. Do not climb a closed stepladder.  · Add color or contrast paint or tape to grab bars and handrails in your home. Place contrasting color strips on the first and last steps.  · Use mobility aids as needed, such as canes, walkers, scooters, and crutches.  · Turn on lights if it is dark. Replace any light bulbs that burn out.  · Set up furniture so that there are clear paths. Keep the furniture in the same spot.  · Fix any uneven floor surfaces.  · Choose a carpet design that does not hide the edge of steps of a stairway.  · Be aware of any and all pets.  · Review your medicines with your healthcare provider. Some medicines can cause dizziness or changes in blood pressure, which increase your risk of falling.  Talk with  your health care provider about other ways that you can decrease your risk of falls. This may include working with a physical therapist or  to improve your strength, balance, and endurance.  This information is not intended to replace advice given to you by your health care provider. Make sure you discuss any questions you have with your health care provider.  Document Released: 2003 Document Revised: 2017 Document Reviewed: 2016  [employer] Interactive Patient Education © 2018 [employer]    Medicare Wellness  Personal Prevention Plan of Service     Date of Office Visit:  2018  Encounter Provider:  May Martin MD  Place of Service:  [facility]  Patient Name: Gavi Gordon  :  1934    As part of the Medicare Wellness portion of your visit today, we are providing you with this personalized preventive plan of services (PPPS). This plan is based upon recommendations of the [place] Preventive Services Task Force (USPSTF) and the Advisory Committee on Immunization Practices (ACIP).    This lists the preventive care services that should be considered, and provides dates of when you are due. Items listed as completed are up-to-date and do not require any further intervention.    Health Maintenance   Topic Date Due   • ZOSTER VACCINE (2 of 2) 2000   • MEDICARE ANNUAL WELLNESS  2016   • PNEUMOCOCCAL VACCINES (65+ LOW/MEDIUM RISK) (2 of 2 - PPSV23) 10/08/2017   • INFLUENZA VACCINE  2018   • DXA SCAN  2018   • LIPID PANEL  2019   • TDAP/TD VACCINES (2 - Td) 2026       No orders of the defined types were placed in this encounter.      No Follow-up on file.

## 2018-08-14 ENCOUNTER — EPISODE CHANGES (OUTPATIENT)
Dept: CASE MANAGEMENT | Facility: OTHER | Age: 83
End: 2018-08-14

## 2018-08-23 ENCOUNTER — PATIENT OUTREACH (OUTPATIENT)
Dept: CASE MANAGEMENT | Facility: OTHER | Age: 83
End: 2018-08-23

## 2018-08-23 NOTE — OUTREACH NOTE
Care Management Plan 8/23/2018   Lifestyle Goals Less shortness of air   Lifestyle Goals Patient reports she uses oxygen at all times.   Barriers Disease education   Self Management Breathing techniques   Annual Wellness Visit:  Patient Has Completed   Specific Disease Process Teaching COPD   Does patient have depression diagnosis? No   Advanced Directives: Patient Has   Medication Adherence Medications understood   Medication Adherence Patient stated grandaughter assist with med setup   Goal Progress Making Progress Toward Goal(s)   Readiness Scale 4   Confidence Scale 3   Health Literacy Moderate     The main concerns and/or symptoms the patient would like to address are: Patient states her granddaughter helps her with medication setup to help her take as prescribed.    Education/instruction provided by Care Coordinator: CA discussed use of nebulizer or inhaler with patient as per current medication list.    Follow Up Outreach Due: September    Rachel Mcclellan RN

## 2018-08-30 RX ORDER — ALENDRONATE SODIUM 70 MG/1
TABLET ORAL
Qty: 12 TABLET | Refills: 3 | Status: SHIPPED | OUTPATIENT
Start: 2018-08-30 | End: 2019-08-01 | Stop reason: SDUPTHER

## 2018-09-28 ENCOUNTER — PATIENT OUTREACH (OUTPATIENT)
Dept: CASE MANAGEMENT | Facility: OTHER | Age: 83
End: 2018-09-28

## 2018-09-28 NOTE — OUTREACH NOTE
Care Management Plan 9/28/2018   Lifestyle Goals Eat a healthy diet   Lifestyle Goals -   Barriers Other (See Comment)   Barriers Patient stated she is unable to exercise very ofter due to health issues.   Self Management Medication Adherence   Self Management Patient reports she is taking her medications as prescribed   Annual Wellness Visit:  Patient Has Completed   Care Gaps Addressed Flu Shot   Care Gaps Addressed Patient stated she always get her flu shot, and will this season also.   Specific Disease Process Teaching Hypertension   Does patient have depression diagnosis? -   Advanced Directives: Patient Has   Medication Adherence Medications understood   Medication Adherence -   Goal Progress -   Readiness Scale -   Confidence Scale -   Health Literacy -     The main concerns and/or symptoms the patient would like to address are: Patient stated she is good overall. No complaints or concerns voiced at time of call.    Education/instruction provided by Care Coordinator: Discussed flu season and need for vaccination, patient reports she will get her flu shot for this flu season.    Follow Up Outreach Due: October    Rachel Mcclellan RN

## 2018-10-18 RX ORDER — DONEPEZIL HYDROCHLORIDE 10 MG/1
TABLET, FILM COATED ORAL
Qty: 90 TABLET | Refills: 1 | Status: SHIPPED | OUTPATIENT
Start: 2018-10-18 | End: 2019-04-16 | Stop reason: SDUPTHER

## 2018-10-23 DIAGNOSIS — E03.9 ACQUIRED HYPOTHYROIDISM: ICD-10-CM

## 2018-10-23 RX ORDER — LEVOTHYROXINE SODIUM 0.05 MG/1
TABLET ORAL
Qty: 90 TABLET | Refills: 0 | Status: SHIPPED | OUTPATIENT
Start: 2018-10-23 | End: 2019-01-21 | Stop reason: SDUPTHER

## 2018-10-24 RX ORDER — RANITIDINE 150 MG/1
TABLET ORAL
Qty: 180 TABLET | Refills: 1 | Status: SHIPPED | OUTPATIENT
Start: 2018-10-24 | End: 2019-04-22 | Stop reason: SDUPTHER

## 2018-10-24 RX ORDER — POTASSIUM CHLORIDE 750 MG/1
TABLET, FILM COATED, EXTENDED RELEASE ORAL
Qty: 90 TABLET | Refills: 1 | Status: SHIPPED | OUTPATIENT
Start: 2018-10-24 | End: 2019-04-22 | Stop reason: SDUPTHER

## 2018-10-29 ENCOUNTER — PATIENT OUTREACH (OUTPATIENT)
Dept: CASE MANAGEMENT | Facility: OTHER | Age: 83
End: 2018-10-29

## 2018-10-29 NOTE — OUTREACH NOTE
Care Management Plan 10/29/2018   Lifestyle Goals Routine follow-up with doctor(s)   Lifestyle Goals Patient and CA discussed upcoming appointment with PCP in November.   Barriers Disease education   Barriers -   Self Management Get flu/pneumonia shot   Self Management Patient hs not recieved flu vaccine as yet for this season.   Annual Wellness Visit:  Patient Has Completed   Care Gaps Addressed Flu Shot   Care Gaps Addressed -   Specific Disease Process Teaching COPD   Does patient have depression diagnosis? No   Advanced Directives: Patient Has   Medication Adherence Medications understood   Medication Adherence -   Goal Progress -   Readiness Scale -   Confidence Scale -   Health Literacy -     The main concerns and/or symptoms the patient would like to address are: Discussion with patient on flu vaccine for this flu season. Stated if she does not get before November appointment with PCP, she will address then.    Education/instruction provided by Care Coordinator: COPD discussion, patient stated she does not use nebulizer treatments often as she does not usually need them. Mostly stays inside due to weather changes.    Follow Up Outreach Due: November    Rachel Mcclellan RN

## 2018-11-01 RX ORDER — SPIRONOLACTONE 50 MG/1
TABLET, FILM COATED ORAL
Qty: 90 TABLET | Refills: 1 | Status: SHIPPED | OUTPATIENT
Start: 2018-11-01 | End: 2019-04-30 | Stop reason: SDUPTHER

## 2018-11-05 ENCOUNTER — CLINICAL SUPPORT (OUTPATIENT)
Dept: FAMILY MEDICINE CLINIC | Facility: CLINIC | Age: 83
End: 2018-11-05

## 2018-11-05 DIAGNOSIS — Z23 FLU VACCINE NEED: Primary | ICD-10-CM

## 2018-11-05 PROCEDURE — 90662 IIV NO PRSV INCREASED AG IM: CPT | Performed by: INTERNAL MEDICINE

## 2018-11-05 PROCEDURE — G0008 ADMIN INFLUENZA VIRUS VAC: HCPCS | Performed by: INTERNAL MEDICINE

## 2018-11-08 DIAGNOSIS — F41.1 GENERALIZED ANXIETY DISORDER: ICD-10-CM

## 2018-11-08 RX ORDER — LORAZEPAM 0.5 MG/1
0.5 TABLET ORAL EVERY 8 HOURS PRN
Qty: 270 TABLET | Refills: 0 | Status: SHIPPED | OUTPATIENT
Start: 2018-11-08 | End: 2019-03-13 | Stop reason: SDUPTHER

## 2018-11-10 DIAGNOSIS — R41.3 MEMORY IMPAIRMENT: ICD-10-CM

## 2018-11-10 DIAGNOSIS — M15.9 PRIMARY OSTEOARTHRITIS INVOLVING MULTIPLE JOINTS: ICD-10-CM

## 2018-11-10 DIAGNOSIS — I47.1 SUPRAVENTRICULAR TACHYCARDIA (HCC): ICD-10-CM

## 2018-11-10 DIAGNOSIS — J44.9 CHRONIC OBSTRUCTIVE PULMONARY DISEASE, UNSPECIFIED COPD TYPE (HCC): ICD-10-CM

## 2018-11-10 DIAGNOSIS — F41.1 GAD (GENERALIZED ANXIETY DISORDER): ICD-10-CM

## 2018-11-12 ENCOUNTER — OFFICE VISIT (OUTPATIENT)
Dept: FAMILY MEDICINE CLINIC | Facility: CLINIC | Age: 83
End: 2018-11-12

## 2018-11-12 VITALS
OXYGEN SATURATION: 96 % | HEIGHT: 64 IN | TEMPERATURE: 97.2 F | HEART RATE: 77 BPM | SYSTOLIC BLOOD PRESSURE: 120 MMHG | DIASTOLIC BLOOD PRESSURE: 64 MMHG | BODY MASS INDEX: 28.48 KG/M2

## 2018-11-12 DIAGNOSIS — J44.9 CHRONIC OBSTRUCTIVE PULMONARY DISEASE, UNSPECIFIED COPD TYPE (HCC): ICD-10-CM

## 2018-11-12 DIAGNOSIS — M81.6 LOCALIZED OSTEOPOROSIS WITHOUT CURRENT PATHOLOGICAL FRACTURE: ICD-10-CM

## 2018-11-12 DIAGNOSIS — Z99.81 SUPPLEMENTAL OXYGEN DEPENDENT: ICD-10-CM

## 2018-11-12 DIAGNOSIS — E03.9 ACQUIRED HYPOTHYROIDISM: ICD-10-CM

## 2018-11-12 DIAGNOSIS — I10 ESSENTIAL HYPERTENSION: ICD-10-CM

## 2018-11-12 DIAGNOSIS — E53.8 B12 DEFICIENCY: ICD-10-CM

## 2018-11-12 DIAGNOSIS — F41.1 GENERALIZED ANXIETY DISORDER: Primary | ICD-10-CM

## 2018-11-12 DIAGNOSIS — Z51.81 THERAPEUTIC DRUG MONITORING: ICD-10-CM

## 2018-11-12 PROCEDURE — 99214 OFFICE O/P EST MOD 30 MIN: CPT | Performed by: FAMILY MEDICINE

## 2018-11-12 RX ORDER — CLOPIDOGREL BISULFATE 75 MG/1
TABLET ORAL
Qty: 90 TABLET | Refills: 1 | Status: SHIPPED | OUTPATIENT
Start: 2018-11-12 | End: 2019-05-11 | Stop reason: SDUPTHER

## 2018-11-12 RX ORDER — ATORVASTATIN CALCIUM 80 MG/1
TABLET, FILM COATED ORAL
Qty: 90 TABLET | Refills: 1 | Status: SHIPPED | OUTPATIENT
Start: 2018-11-12 | End: 2019-05-11 | Stop reason: SDUPTHER

## 2018-11-12 RX ORDER — CELECOXIB 200 MG/1
CAPSULE ORAL
Qty: 90 CAPSULE | Refills: 1 | Status: SHIPPED | OUTPATIENT
Start: 2018-11-12 | End: 2019-05-11 | Stop reason: SDUPTHER

## 2018-11-12 RX ORDER — MEMANTINE HYDROCHLORIDE 28 MG/1
CAPSULE, EXTENDED RELEASE ORAL
Qty: 90 CAPSULE | Refills: 1 | Status: SHIPPED | OUTPATIENT
Start: 2018-11-12 | End: 2019-05-11 | Stop reason: SDUPTHER

## 2018-11-12 RX ORDER — DIGOXIN 125 MCG
TABLET ORAL
Qty: 90 TABLET | Refills: 1 | Status: SHIPPED | OUTPATIENT
Start: 2018-11-12 | End: 2019-05-11 | Stop reason: SDUPTHER

## 2018-11-12 RX ORDER — BUSPIRONE HYDROCHLORIDE 10 MG/1
TABLET ORAL
Qty: 180 TABLET | Refills: 1 | Status: SHIPPED | OUTPATIENT
Start: 2018-11-12 | End: 2019-05-11 | Stop reason: SDUPTHER

## 2018-11-12 NOTE — PROGRESS NOTES
"Subjective   Chief Complaint   Patient presents with   • COPD     3 months     Gavi Gordon is a 84 y.o. female.   COPD (3 months)    History of Present Illness     Presents today for a 3 month follow up  She is accompanied by her daughter today    Hypertension - managed with coreg, lanoxin, aldactone, lasix    Copd - currently stable  Currently managed with advair, albuterol PRN, nebulizer PRN  She additionally has supplemental oxygen at 2LPM by NC  Denies any issues today    Anxiety - well managed with ativan TID PRN  Denies sedation or impairment with use  Due for uds    Cyanocobalamin deficiency - due for lab work    Hypothyroidism - due for lab work    Due for shingles    Due for bone density scan    Denies any new concerns or complaints    The following portions of the patient's history were reviewed and updated as appropriate: allergies, current medications, past family history, past medical history, past social history, past surgical history and problem list.    Review of Systems   Constitutional: Negative for appetite change, chills, fatigue and fever.   HENT: Negative for congestion, ear pain, rhinorrhea and sore throat.    Eyes: Negative for pain.   Respiratory: Negative for cough and shortness of breath.    Cardiovascular: Negative for chest pain and palpitations.   Gastrointestinal: Negative for abdominal pain, constipation, diarrhea and nausea.   Genitourinary: Negative for dysuria.   Musculoskeletal: Negative for back pain, joint swelling and neck pain.   Skin: Negative for rash.   Neurological: Negative for dizziness and headaches.       Objective   /64   Pulse 77   Temp 97.2 °F (36.2 °C)   Ht 162.6 cm (64.02\")   SpO2 96%   BMI 28.48 kg/m²   Physical Exam   Constitutional: She is oriented to person, place, and time. She appears well-developed and well-nourished.   HENT:   Head: Normocephalic and atraumatic.   Eyes: Pupils are equal, round, and reactive to light.   Neck: Normal range of " motion. Neck supple.   Cardiovascular: Normal rate, regular rhythm and normal heart sounds.   Pulmonary/Chest: Effort normal. No respiratory distress. She has decreased breath sounds. She has no wheezes. She has no rales.   Supplemental oxygen with NC in place   Abdominal: Soft. Bowel sounds are normal.   Musculoskeletal:   Examined in wheelchair   Neurological: She is alert and oriented to person, place, and time.   Skin: Skin is warm and dry.   Psychiatric: She has a normal mood and affect.   Nursing note and vitals reviewed.      Assessment/Plan   Problems Addressed this Visit        Cardiovascular and Mediastinum    Hypertension    Relevant Orders    CBC & Differential    Comprehensive Metabolic Panel       Respiratory    Chronic obstructive pulmonary disease (CMS/HCC)    Supplemental oxygen dependent       Digestive    B12 deficiency    Relevant Orders    Vitamin B12 & Folate       Endocrine    Acquired hypothyroidism    Relevant Orders    TSH       Musculoskeletal and Integument    Osteoporosis (Chronic)    Relevant Orders    DEXA Bone Density Axial       Other    Generalized anxiety disorder - Primary      Other Visit Diagnoses     Therapeutic drug monitoring        Relevant Orders    ToxASSURE Select 13 (MW) - Urine, Clean Catch        Labs ordered    uds ordered  The patient has read and signed the [facility] Controlled Substance Contract.  I will continue to see patient for regular follow up appointments.  They are well controlled on their medication.  BEVERLY is updated every 3 months. The patient is aware of the potential for addiction and dependence.  beverly reviewed [id number]    Bone density ordered and scheduled    Recheck in 3 months

## 2019-01-16 DIAGNOSIS — Z99.81 SUPPLEMENTAL OXYGEN DEPENDENT: ICD-10-CM

## 2019-01-16 DIAGNOSIS — J44.9 CHRONIC OBSTRUCTIVE PULMONARY DISEASE, UNSPECIFIED COPD TYPE (HCC): Primary | ICD-10-CM

## 2019-01-21 DIAGNOSIS — E03.9 ACQUIRED HYPOTHYROIDISM: ICD-10-CM

## 2019-01-21 RX ORDER — LEVOTHYROXINE SODIUM 50 UG/1
TABLET ORAL
Qty: 90 TABLET | Refills: 0 | Status: SHIPPED | OUTPATIENT
Start: 2019-01-21 | End: 2019-04-21 | Stop reason: SDUPTHER

## 2019-01-28 ENCOUNTER — OFFICE VISIT (OUTPATIENT)
Dept: PULMONOLOGY | Facility: CLINIC | Age: 84
End: 2019-01-28

## 2019-01-28 VITALS
SYSTOLIC BLOOD PRESSURE: 126 MMHG | HEIGHT: 64 IN | HEART RATE: 73 BPM | BODY MASS INDEX: 29.6 KG/M2 | OXYGEN SATURATION: 85 % | WEIGHT: 173.4 LBS | DIASTOLIC BLOOD PRESSURE: 71 MMHG

## 2019-01-28 VITALS
HEIGHT: 64 IN | DIASTOLIC BLOOD PRESSURE: 71 MMHG | WEIGHT: 173.4 LBS | OXYGEN SATURATION: 85 % | HEART RATE: 73 BPM | SYSTOLIC BLOOD PRESSURE: 126 MMHG | BODY MASS INDEX: 29.6 KG/M2

## 2019-01-28 DIAGNOSIS — J30.89 PERENNIAL ALLERGIC RHINITIS: ICD-10-CM

## 2019-01-28 DIAGNOSIS — J44.1 ACUTE EXACERBATION OF CHRONIC OBSTRUCTIVE PULMONARY DISEASE (COPD) (HCC): Primary | ICD-10-CM

## 2019-01-28 DIAGNOSIS — J44.9 MODERATE COPD (CHRONIC OBSTRUCTIVE PULMONARY DISEASE) (HCC): ICD-10-CM

## 2019-01-28 DIAGNOSIS — Z87.891 PERSONAL HISTORY OF TOBACCO USE, PRESENTING HAZARDS TO HEALTH: ICD-10-CM

## 2019-01-28 DIAGNOSIS — J96.11 CHRONIC RESPIRATORY FAILURE WITH HYPOXIA (HCC): ICD-10-CM

## 2019-01-28 DIAGNOSIS — R06.09 DYSPNEA ON EXERTION: ICD-10-CM

## 2019-01-28 DIAGNOSIS — R06.00 DYSPNEA, UNSPECIFIED TYPE: Primary | ICD-10-CM

## 2019-01-28 PROCEDURE — 96372 THER/PROPH/DIAG INJ SC/IM: CPT | Performed by: INTERNAL MEDICINE

## 2019-01-28 PROCEDURE — 94010 BREATHING CAPACITY TEST: CPT | Performed by: INTERNAL MEDICINE

## 2019-01-28 PROCEDURE — 99204 OFFICE O/P NEW MOD 45 MIN: CPT | Performed by: INTERNAL MEDICINE

## 2019-01-28 RX ORDER — DOXYCYCLINE HYCLATE 100 MG
100 TABLET ORAL 2 TIMES DAILY
Qty: 14 TABLET | Refills: 0 | Status: SHIPPED | OUTPATIENT
Start: 2019-01-28 | End: 2019-01-28 | Stop reason: SDUPTHER

## 2019-01-28 RX ORDER — METHYLPREDNISOLONE ACETATE 40 MG/ML
80 INJECTION, SUSPENSION INTRA-ARTICULAR; INTRALESIONAL; INTRAMUSCULAR; SOFT TISSUE ONCE
Status: COMPLETED | OUTPATIENT
Start: 2019-01-28 | End: 2019-01-28

## 2019-01-28 RX ORDER — DOXYCYCLINE HYCLATE 100 MG
100 TABLET ORAL 2 TIMES DAILY
Qty: 14 TABLET | Refills: 0 | Status: SHIPPED | OUTPATIENT
Start: 2019-01-28 | End: 2019-04-03

## 2019-01-28 RX ADMIN — METHYLPREDNISOLONE ACETATE 80 MG: 40 INJECTION, SUSPENSION INTRA-ARTICULAR; INTRALESIONAL; INTRAMUSCULAR; SOFT TISSUE at 14:09

## 2019-01-28 NOTE — PROGRESS NOTES
Pulmonary Consultation    May Martin MD,    Thank you for asking me to see Gavi Gordon for   Chief Complaint   Patient presents with   • Shortness of Breath   .    Subjective     History of Present Illness  aGvi Gordon is a 84 y.o. female with a PMH significant for COPD, chronic hypoxemic respiratory failure, past tobacco use, allergies, HTN, osteoporosis and GERD who presents for evaluation of COPD and dyspnea. Pt previously followed with Dr. Conde but she has not seen him since he went back to [place]. She was diagnosed with COPD many years ago for which she takes albuterol nebs prn and Advair 500 BID. Pt states she rarely uses her albuterol. She admits to dyspnea on minimal exertion which is relieved with rest. Pt denies wheeze but she does have a cough recently productive of green sputum. She denies chest pain or tightness. Pt admits to some sinus congestion and postnasal gtt, but she denies fevers or chills. She does wear 2lpm O2 ATC for the past 5yrs. She previously smoked 1pdd for 51yrs but she quit over 20yrs ago. Pt has worked at [employer] as an . Her great grandson has asthma, but there is no lung cancer in the family.    Review of Systems: History obtained from chart review and the patient.  Review of Systems   Constitutional: Positive for fatigue.   HENT: Positive for congestion, hearing loss and postnasal drip.    Respiratory: Positive for cough, shortness of breath and wheezing.    Musculoskeletal: Positive for arthralgias.   Psychiatric/Behavioral: Positive for dysphoric mood. The patient is nervous/anxious.      As described in the HPI. Otherwise, remainder of ROS (14 systems) were negative.    Patient Active Problem List   Diagnosis   • Bilateral lower extremity edema   • Muscle spasms of both lower extremities   • Chronic diarrhea   • Memory impairment   • Osteoporosis   • Osteopenia   • Chronic low back pain   • Neuropathy of both upper extremities   • Neuropathy of left  upper extremity   • Moderate COPD (chronic obstructive pulmonary disease) (CMS/HCC)   • Supraventricular tachycardia (CMS/HCC)   • Hypertension   • Angina pectoris (CMS/HCC)   • Spinal stenosis of cervical region   • Anterolisthesis   • B12 deficiency   • Gastroesophageal reflux disease without esophagitis   • Perennial allergic rhinitis   • Bilateral edema of lower extremity   • Supplemental oxygen dependent   • Primary osteoarthritis involving multiple joints   • Generalized anxiety disorder   • Acquired hypothyroidism   • Personal history of tobacco use, presenting hazards to health   • Chronic respiratory failure with hypoxia (CMS/HCC)         Current Outpatient Medications:   •  albuterol (PROAIR RESPICLICK) 108 (90 BASE) MCG/ACT inhaler, Inhale 1 puff Every 4 (Four) Hours As Needed for Wheezing., Disp: 1 inhaler, Rfl: 12  •  albuterol (PROVENTIL) (2.5 MG/3ML) 0.083% nebulizer solution, Take 2.5 mg by nebulization 4 (Four) Times a Day., Disp: 1 vial, Rfl: 12  •  alendronate (FOSAMAX) 70 MG tablet, TAKE 1 TABLET ONE TIME PER WEEK, Disp: 12 tablet, Rfl: 3  •  atorvastatin (LIPITOR) 80 MG tablet, TAKE 1 TABLET EVERY NIGHT, Disp: 90 tablet, Rfl: 1  •  busPIRone (BUSPAR) 10 MG tablet, TAKE 1 TABLET TWICE A DAY AS NEEDED FOR ANXIETY, Disp: 180 tablet, Rfl: 1  •  Calcium Carb-Cholecalciferol 600-800 MG-UNIT tablet, Take 1 tablet by mouth 2 (two) times a day., Disp: , Rfl:   •  carvedilol (COREG) 3.125 MG tablet, Take 1 tablet by mouth 2 (Two) Times a Day With Meals., Disp: 180 tablet, Rfl: 2  •  celecoxib (CeleBREX) 200 MG capsule, TAKE 1 CAPSULE DAILY, Disp: 90 capsule, Rfl: 1  •  clopidogrel (PLAVIX) 75 MG tablet, TAKE 1 TABLET DAILY, Disp: 90 tablet, Rfl: 1  •  digoxin (LANOXIN) 125 MCG tablet, TAKE 1 TABLET DAILY, Disp: 90 tablet, Rfl: 1  •  diphenoxylate-atropine (LOMOTIL) 2.5-0.025 MG per tablet, 1 TABLET BY MOUTH 2 TIMES A DAY, Disp: 180 tablet, Rfl: 0  •  donepezil (ARICEPT) 10 MG tablet, TAKE 1 TABLET EVERY  NIGHT, Disp: 90 tablet, Rfl: 1  •  fluticasone (FLONASE) 50 MCG/ACT nasal spray, 2 sprays into each nostril Daily., Disp: 1 each, Rfl: 3  •  furosemide (LASIX) 40 MG tablet, Take 1 tablet by mouth 2 (Two) Times a Day., Disp: 180 tablet, Rfl: 1  •  isosorbide mononitrate (IMDUR) 60 MG 24 hr tablet, Take 1 tablet by mouth Daily., Disp: 90 tablet, Rfl: 3  •  KLOR-CON 10 MEQ CR tablet, TAKE 1 TABLET DAILY, Disp: 90 tablet, Rfl: 1  •  LEVOXYL 50 MCG tablet, TAKE 1 TABLET DAILY, Disp: 90 tablet, Rfl: 0  •  LORazepam (ATIVAN) 0.5 MG tablet, Take 1 tablet by mouth Every 8 (Eight) Hours As Needed for Anxiety., Disp: 270 tablet, Rfl: 0  •  memantine (NAMENDA XR) 28 MG capsule sustained-release 24 hr extended release capsule, TAKE 1 CAPSULE DAILY, Disp: 90 capsule, Rfl: 1  •  montelukast (SINGULAIR) 10 MG tablet, Take 1 tablet by mouth Every Night., Disp: 90 tablet, Rfl: 0  •  Multiple Vitamins-Minerals (CENTRUM PO), Take 1 tablet by mouth daily., Disp: , Rfl:   •  omeprazole (priLOSEC) 20 MG capsule, TAKE 1 CAPSULE TWICE A DAY, Disp: 180 capsule, Rfl: 3  •  raNITIdine (ZANTAC) 150 MG tablet, TAKE 1 TABLET TWICE A DAY AS NEEDED FOR HEARTBURN OR INDIGESTION, Disp: 180 tablet, Rfl: 1  •  venlafaxine XR (EFFEXOR-XR) 75 MG 24 hr capsule, TAKE 2 CAPSULES IN THE MORNING AND 1 CAPSULE AT NIGHT, Disp: 240 capsule, Rfl: 3  •  doxycycline (VIBRAMYICN) 100 MG tablet, Take 1 tablet by mouth 2 (Two) Times a Day., Disp: 14 tablet, Rfl: 0  •  Fluticasone-Umeclidin-Vilant 100-62.5-25 MCG/INH aerosol powder , Inhale 1 each Daily., Disp: 1 each, Rfl: 11  •  spironolactone (ALDACTONE) 50 MG tablet, TAKE 1 TABLET DAILY, Disp: 90 tablet, Rfl: 1    Current Facility-Administered Medications:   •  cyanocobalamin injection 1,000 mcg, 1,000 mcg, Intramuscular, Q30 Days, May Martin MD, 1,000 mcg at 09/29/17 1126  •  methylPREDNISolone acetate (DEPO-medrol) injection 80 mg, 80 mg, Intramuscular, Once, Joan Andrews MD    Allergies    Allergen Reactions   • Bee Venom    • Ceclor [Cefaclor]    • Latex Itching   • Prednisone        Past Medical History:   Diagnosis Date   • Abnormality of nail of toe    • Acute bronchitis, unspecified    • Age-asscioated Memory impairment    • Allergic rhinitis    • Anxiety state    • Backache    • Body mass index (BMI) of 30+ - obesity    • Chronic dermatitis    • Chronic obstructive lung disease (CMS/HCC)    • Chronic respiratory failure with hypoxia (CMS/HCC)    • Cough    • Depressive disorder    • Dermatitis    • Diarrhea    • Edema of lower extremity    • Gastritis    • Generalized anxiety disorder    • Hyperbilirubinemia    • Hyperlipidemia    • Hypothyroidism    • IBS (irritable bowel syndrome)    • Intertrigo    • Memory impairment    • Multiple acquired skin tags    • Need for prophylactic vaccination and inoculation against diphtheria-tetanus-pertussis, combined [DTP    • Needs Influenza immunization    • Osteoarthritis    • Osteopenia    • Other lesions of oral mucosa    • Pain in lower limb    • Seborrheic dermatitis, unspecified    • Seborrheic keratoses    • Shoulder joint pain    • Synovial cyst popliteal space    • Tinea unguium    • Xerosis cutis      Past Surgical History:   Procedure Laterality Date   • CARPAL TUNNEL RELEASE     • ENDOSCOPY AND COLONOSCOPY     • HYSTERECTOMY     • INJECTION OF MEDICATION      Depo Medrol (80mg)   • INJECTION OF MEDICATION      Kenalog (80mg)   • PRE-MALIGNANT / BENIGN SKIN LESION EXCISION     • SKIN TAG REMOVAL     • TONSILLECTOMY       Social History     Socioeconomic History   • Marital status:      Spouse name: Not on file   • Number of children: Not on file   • Years of education: Not on file   • Highest education level: Not on file   Tobacco Use   • Smoking status: Former Smoker   • Smokeless tobacco: Never Used   Substance and Sexual Activity   • Alcohol use: No   • Drug use: No     Family History   Problem Relation Age of Onset   • Depression  "Mother    • Diabetes Sister    • Osteoarthritis Sister    • Osteoporosis Sister    • Depression Brother    • Diabetes Brother    • Heart disease Brother    • Hypertension Brother    • Other Brother         smoking tobacco   • Cancer Brother         stomach   • Tuberculosis Other           Objective     Blood pressure 126/71, pulse 73, height 162.6 cm (64\"), weight 78.7 kg (173 lb 6.4 oz), SpO2 (!) 85 %.  Physical Exam   Constitutional: She is oriented to person, place, and time. Vital signs are normal. She appears well-developed and well-nourished.   HENT:   Head: Normocephalic and atraumatic.   Nose: Nose normal.   Mouth/Throat: Uvula is midline, oropharynx is clear and moist and mucous membranes are normal.   Mallampati 4   Eyes: Conjunctivae, EOM and lids are normal. Pupils are equal, round, and reactive to light.   Eyeglasses   Neck: Trachea normal and normal range of motion. No tracheal tenderness present. No thyroid mass present.   Cardiovascular: Normal rate, regular rhythm and normal heart sounds. PMI is not displaced. Exam reveals no gallop.   No murmur heard.  Pulmonary/Chest: Effort normal. No respiratory distress. She has no decreased breath sounds. She has wheezes (Diffuse expiratory). She has no rhonchi. Chest wall is not dull to percussion. She exhibits no tenderness.   Prolonged expiration   Abdominal: Soft. Normal appearance and bowel sounds are normal. There is no hepatomegaly. There is no tenderness.   Musculoskeletal:   In wheelchair, no extremity edema     Vascular Status -  Her right foot exhibits no edema. Her left foot exhibits no edema.  Lymphadenopathy:        Head (right side): No submandibular adenopathy present.        Head (left side): No submandibular adenopathy present.     She has no cervical adenopathy.        Right: No supraclavicular adenopathy present.        Left: No supraclavicular adenopathy present.   Neurological: She is alert and oriented to person, place, and time.   Skin: " Skin is warm and dry. No rash noted. No cyanosis. Nails show no clubbing.   Psychiatric: She has a normal mood and affect. Her speech is normal and behavior is normal. Judgment normal.   Nursing note and vitals reviewed.      PFTs: 1/28/19 (independently reviewed and interpreted by me)  Ratio 45  FVC 2.09/ 86%  FEV1 0.93/ 52%  Moderate obstruction.  No comparative data available.       Assessment/Plan     Gavi was seen today for shortness of breath.    Diagnoses and all orders for this visit:    Acute exacerbation of chronic obstructive pulmonary disease (COPD) (CMS/[facility])  -     methylPREDNISolone acetate (DEPO-medrol) injection 80 mg; Inject 2 mL into the appropriate muscle as directed by prescriber 1 (One) Time.  -     Discontinue: doxycycline (VIBRAMYICN) 100 MG tablet; Take 1 tablet by mouth 2 (Two) Times a Day.  -     doxycycline (VIBRAMYICN) 100 MG tablet; Take 1 tablet by mouth 2 (Two) Times a Day.    Moderate COPD (chronic obstructive pulmonary disease) (CMS/[facility])  -     Discontinue: Fluticasone-Umeclidin-Vilant 100-62.5-25 MCG/INH aerosol powder ; Inhale 1 each Daily.  -     Fluticasone-Umeclidin-Vilant 100-62.5-25 MCG/INH aerosol powder ; Inhale 1 each Daily.  -     Spirometry Without Bronchodilator    Chronic respiratory failure with hypoxia (CMS/[facility])    Personal history of tobacco use, presenting hazards to health    Perennial allergic rhinitis         Discussion/ Recommendations:   Spirometry is consistent with moderate COPD.  I think the patient is experiencing acute exacerbation given her change in cough, dyspnea, and new onset wheeze.  I think she would benefit from steroids as well as empiric respiratory antibiotic given her sputum changes.  She does continue to have dyspnea at baseline despite high-dose ICS/LABA, so I think it is worth escalate and the triple therapy with the addition of a LAMA.  I did discuss with her that the easiest thing to do would be to change her Advair to the new Trelegy  which would also simplify her regimen.  Otherwise, some of her dyspnea is due to significant deconditioning which has occurred with age.    -Depo-Medrol 80 mg IM today.  -Doxycycline 100 mg twice daily ×7 days.  -Stop Advair and start Trelegy daily.  Instructed on proper technique.  -Continue albuterol MDI or nebulizer as needed for dyspnea or wheeze.  -Continue supplemental oxygen.  Recommended that she start using her pulse oximeter at home to check her saturations.  Goal is greater than 88%.  -If her nasal congestion and drainage persists, I would recommend that she restart Flonase on a daily basis.  -Encouraged exercise as tolerated.  -Annual flu vaccine.    Patient's Body mass index is 29.76 kg/m². BMI is above normal parameters. Recommendations include: exercise counseling.           Return in about 4 weeks (around 2/25/2019) for Recheck COPD.      Thank you for allowing me to participate in the care of Gavi Gordon. Please do not hesitate to contact me with any questions.         This document has been electronically signed by Joan Andrews MD on January 28, 2019 2:07 PM      Dictated using Dragon

## 2019-02-09 DIAGNOSIS — I10 ESSENTIAL HYPERTENSION: ICD-10-CM

## 2019-02-11 RX ORDER — FUROSEMIDE 40 MG/1
TABLET ORAL
Qty: 180 TABLET | Refills: 1 | Status: SHIPPED | OUTPATIENT
Start: 2019-02-11 | End: 2019-08-09 | Stop reason: SDUPTHER

## 2019-03-13 ENCOUNTER — OFFICE VISIT (OUTPATIENT)
Dept: FAMILY MEDICINE CLINIC | Facility: CLINIC | Age: 84
End: 2019-03-13

## 2019-03-13 ENCOUNTER — LAB (OUTPATIENT)
Dept: LAB | Facility: OTHER | Age: 84
End: 2019-03-13

## 2019-03-13 VITALS
DIASTOLIC BLOOD PRESSURE: 70 MMHG | HEIGHT: 64 IN | WEIGHT: 173 LBS | OXYGEN SATURATION: 93 % | HEART RATE: 100 BPM | BODY MASS INDEX: 29.53 KG/M2 | TEMPERATURE: 98.9 F | SYSTOLIC BLOOD PRESSURE: 120 MMHG

## 2019-03-13 DIAGNOSIS — Z99.81 SUPPLEMENTAL OXYGEN DEPENDENT: ICD-10-CM

## 2019-03-13 DIAGNOSIS — E03.9 ACQUIRED HYPOTHYROIDISM: ICD-10-CM

## 2019-03-13 DIAGNOSIS — R41.3 MEMORY IMPAIRMENT: ICD-10-CM

## 2019-03-13 DIAGNOSIS — F41.1 GENERALIZED ANXIETY DISORDER: Primary | ICD-10-CM

## 2019-03-13 DIAGNOSIS — J44.9 MODERATE COPD (CHRONIC OBSTRUCTIVE PULMONARY DISEASE) (HCC): ICD-10-CM

## 2019-03-13 DIAGNOSIS — I10 ESSENTIAL HYPERTENSION: ICD-10-CM

## 2019-03-13 DIAGNOSIS — R53.1 GENERALIZED WEAKNESS: ICD-10-CM

## 2019-03-13 DIAGNOSIS — Z51.81 THERAPEUTIC DRUG MONITORING: ICD-10-CM

## 2019-03-13 DIAGNOSIS — Z23 NEED FOR SHINGLES VACCINE: ICD-10-CM

## 2019-03-13 PROBLEM — R60.0 BILATERAL EDEMA OF LOWER EXTREMITY: Status: RESOLVED | Noted: 2017-05-04 | Resolved: 2019-03-13

## 2019-03-13 LAB
ALBUMIN SERPL-MCNC: 4 G/DL (ref 3.5–5)
ALBUMIN/GLOB SERPL: 1.2 G/DL (ref 1.1–1.8)
ALP SERPL-CCNC: 111 U/L (ref 38–126)
ALT SERPL W P-5'-P-CCNC: 19 U/L
ANION GAP SERPL CALCULATED.3IONS-SCNC: 7 MMOL/L (ref 5–15)
AST SERPL-CCNC: 24 U/L (ref 14–36)
BASOPHILS # BLD AUTO: 0.02 10*3/MM3 (ref 0–0.2)
BASOPHILS NFR BLD AUTO: 0.2 % (ref 0–2)
BILIRUB SERPL-MCNC: 0.9 MG/DL (ref 0.2–1.3)
BUN BLD-MCNC: 19 MG/DL (ref 7–17)
BUN/CREAT SERPL: 20 (ref 7–25)
CALCIUM SPEC-SCNC: 8.9 MG/DL (ref 8.4–10.2)
CHLORIDE SERPL-SCNC: 103 MMOL/L (ref 98–107)
CO2 SERPL-SCNC: 30 MMOL/L (ref 22–30)
CREAT BLD-MCNC: 0.95 MG/DL (ref 0.52–1.04)
DEPRECATED RDW RBC AUTO: 54.5 FL (ref 36.4–46.3)
EOSINOPHIL # BLD AUTO: 0.51 10*3/MM3 (ref 0–0.7)
EOSINOPHIL NFR BLD AUTO: 5.7 % (ref 0–7)
ERYTHROCYTE [DISTWIDTH] IN BLOOD BY AUTOMATED COUNT: 15.3 % (ref 11.5–14.5)
FOLATE SERPL-MCNC: >20 NG/ML (ref 2.76–21)
GFR SERPL CREATININE-BSD FRML MDRD: 56 ML/MIN/1.73 (ref 39–90)
GLOBULIN UR ELPH-MCNC: 3.4 GM/DL (ref 2.3–3.5)
GLUCOSE BLD-MCNC: 137 MG/DL (ref 74–99)
HCT VFR BLD AUTO: 40.5 % (ref 35–45)
HGB BLD-MCNC: 12.4 G/DL (ref 12–15.5)
LYMPHOCYTES # BLD AUTO: 1.17 10*3/MM3 (ref 0.6–4.2)
LYMPHOCYTES NFR BLD AUTO: 13 % (ref 10–50)
MCH RBC QN AUTO: 30.6 PG (ref 26.5–34)
MCHC RBC AUTO-ENTMCNC: 30.6 G/DL (ref 31.4–36)
MCV RBC AUTO: 100 FL (ref 80–98)
MONOCYTES # BLD AUTO: 0.52 10*3/MM3 (ref 0–0.9)
MONOCYTES NFR BLD AUTO: 5.8 % (ref 0–12)
NEUTROPHILS # BLD AUTO: 6.8 10*3/MM3 (ref 2–8.6)
NEUTROPHILS NFR BLD AUTO: 75.3 % (ref 37–80)
PLATELET # BLD AUTO: 233 10*3/MM3 (ref 150–450)
PMV BLD AUTO: 10.1 FL (ref 8–12)
POTASSIUM BLD-SCNC: 3.8 MMOL/L (ref 3.4–5)
PROT SERPL-MCNC: 7.4 G/DL (ref 6.3–8.2)
RBC # BLD AUTO: 4.05 10*6/MM3 (ref 3.77–5.16)
SODIUM BLD-SCNC: 140 MMOL/L (ref 137–145)
TSH SERPL DL<=0.05 MIU/L-ACNC: 0.95 MIU/ML (ref 0.46–4.68)
VIT B12 BLD-MCNC: 466 PG/ML (ref 239–931)
WBC NRBC COR # BLD: 9.02 10*3/MM3 (ref 3.2–9.8)

## 2019-03-13 PROCEDURE — 80307 DRUG TEST PRSMV CHEM ANLYZR: CPT | Performed by: FAMILY MEDICINE

## 2019-03-13 PROCEDURE — 84443 ASSAY THYROID STIM HORMONE: CPT | Performed by: FAMILY MEDICINE

## 2019-03-13 PROCEDURE — 85025 COMPLETE CBC W/AUTO DIFF WBC: CPT | Performed by: FAMILY MEDICINE

## 2019-03-13 PROCEDURE — 36415 COLL VENOUS BLD VENIPUNCTURE: CPT | Performed by: FAMILY MEDICINE

## 2019-03-13 PROCEDURE — G0481 DRUG TEST DEF 8-14 CLASSES: HCPCS | Performed by: FAMILY MEDICINE

## 2019-03-13 PROCEDURE — 80053 COMPREHEN METABOLIC PANEL: CPT | Performed by: FAMILY MEDICINE

## 2019-03-13 PROCEDURE — 82607 VITAMIN B-12: CPT | Performed by: FAMILY MEDICINE

## 2019-03-13 PROCEDURE — 99214 OFFICE O/P EST MOD 30 MIN: CPT | Performed by: FAMILY MEDICINE

## 2019-03-13 PROCEDURE — 82746 ASSAY OF FOLIC ACID SERUM: CPT | Performed by: FAMILY MEDICINE

## 2019-03-13 RX ORDER — LORAZEPAM 0.5 MG/1
0.5 TABLET ORAL EVERY 8 HOURS PRN
Qty: 270 TABLET | Refills: 0 | Status: SHIPPED | OUTPATIENT
Start: 2019-03-13 | End: 2019-06-24 | Stop reason: SDUPTHER

## 2019-03-13 NOTE — PROGRESS NOTES
"Subjective   Chief Complaint   Patient presents with   • Anxiety     3 mo f/u med refills   • Hypertension   • COPD     Gavi Gordon is a 84 y.o. female.   Anxiety (3 mo f/u med refills); Hypertension; and COPD    History of Present Illness     Hypertension - managed with coreg, lanoxin, aldactone, lasix    Copd - currently stable  Followed by Dr Andrews  Currently managed with advair, albuterol PRN, nebulizer PRN  She additionally has supplemental oxygen at 2LPM by NC  Denies any issues today    Anxiety - managed with ativan TID PRN  Denies sedation or impairment with use  Due for uds    Hypothyroidism - due for lab work    C/o muscle weakness, instability, often sitting in her wheelchair, limited mobility    Due for shingles    Due for bone density scan    The following portions of the patient's history were reviewed and updated as appropriate: allergies, current medications, past family history, past medical history, past social history, past surgical history and problem list.    Review of Systems   Constitutional: Negative for appetite change, chills, fatigue and fever.   HENT: Negative for congestion, ear pain, rhinorrhea and sore throat.    Eyes: Negative for pain.   Respiratory: Negative for cough and shortness of breath.    Cardiovascular: Negative for chest pain and palpitations.   Gastrointestinal: Negative for abdominal pain, constipation, diarrhea and nausea.   Genitourinary: Negative for dysuria.   Musculoskeletal: Negative for back pain, joint swelling and neck pain.   Skin: Negative for rash.   Neurological: Positive for weakness. Negative for dizziness and headaches.       Objective   /70   Pulse 100   Temp 98.9 °F (37.2 °C)   Ht 162.6 cm (64\")   Wt 78.5 kg (173 lb)   SpO2 93% Comment: on 2 liter of o2  BMI 29.70 kg/m²   Physical Exam   Constitutional: She is oriented to person, place, and time. She appears well-developed and well-nourished.   HENT:   Head: Normocephalic and atraumatic. "   Eyes: Pupils are equal, round, and reactive to light.   Neck: Normal range of motion. Neck supple.   Cardiovascular: Normal rate, regular rhythm and normal heart sounds.   Pulmonary/Chest: Effort normal. No respiratory distress. She has wheezes. She has no rales.   Abdominal: Soft. Bowel sounds are normal.   Musculoskeletal:   Examined in wheelchair   Neurological: She is alert and oriented to person, place, and time.   Decreased muscle strength in lower extremities   Skin: Skin is warm and dry.   Psychiatric: She has a normal mood and affect.   Nursing note and vitals reviewed.      Assessment/Plan   Problems Addressed this Visit        Cardiovascular and Mediastinum    Hypertension       Respiratory    Moderate COPD (chronic obstructive pulmonary disease) (CMS/[facility])    Supplemental oxygen dependent       Endocrine    Acquired hypothyroidism       Other    Memory impairment    Generalized anxiety disorder - Primary    Relevant Medications    LORazepam (ATIVAN) 0.5 MG tablet      Other Visit Diagnoses     Generalized weakness        Relevant Orders    Ambulatory Referral to Home Health    Need for shingles vaccine        Relevant Medications    Zoster Vac Recomb Adjuvanted (SHINGRIX) 50 MCG/0.5ML reconstituted suspension        Script for shingles    Patient's Body mass index is 29.7 kg/m². BMI is above normal parameters. Recommendations include: exercise counseling and nutrition counseling.    Referral to home health    The patient has read and signed the [facility] Controlled Substance Contract.  I will continue to see patient for regular follow up appointments.  They are well controlled on their medication.  BEVERLY is updated every 3 months. The patient is aware of the potential for addiction and dependence.  uds ordered  Refilled ativan    Follow up with pulmonology as scheduled  Nebulizer treatment in office today    Labs done today    Recheck in 3 months

## 2019-03-19 LAB — CONV REPORT SUMMARY: NORMAL

## 2019-03-20 RX ORDER — VENLAFAXINE HYDROCHLORIDE 75 MG/1
CAPSULE, EXTENDED RELEASE ORAL
Qty: 270 CAPSULE | Refills: 0 | Status: SHIPPED | OUTPATIENT
Start: 2019-03-20 | End: 2019-06-18 | Stop reason: SDUPTHER

## 2019-04-02 PROBLEM — J44.9 COPD, GROUP B, BY GOLD 2017 CLASSIFICATION (HCC): Chronic | Status: ACTIVE | Noted: 2017-01-06

## 2019-04-02 PROBLEM — R53.81 PHYSICAL DECONDITIONING: Status: ACTIVE | Noted: 2019-04-02

## 2019-04-02 NOTE — PROGRESS NOTES
Pulmonary Office Follow-up     Gavi Gordon is seen in the office today for   Chief Complaint   Patient presents with   • COPD   .    Subjective     History of Present Illness  Gavi Gordon is a 84 y.o. female with a PMH significant for COPD, chronic hypoxemic respiratory failure, past tobacco use, allergies, HTN, osteoporosis and GERD who presents for evaluation of COPD and dyspnea.  She was last seen on 1/28/19, at which time I treated her for an acute exacerbation of COPD with Depo-Medrol and doxycycline.  I also recommended escalating to triple therapy by changing Advair to Trelegy daily.  Her granddaughter states that the patient's dyspnea has not changed significantly since her last visit.  Patient continues to get out of breath with minimal exertion despite using Trelegy daily.  She is also been using albuterol nebs on average 4 times daily as this was the instruction from multiple different providers.  Patient cannot significant Jaswinder perceived much benefit from the albuterol.  She does admit to cough which is occasionally productive but denies any chest pain.  She continues to use supplemental oxygen and is perceiving benefit.  She is currently getting PT/OT at home which have given her instructions for exercise, but the patient does not always remember she is supposed to do the exercises several times a day.  Unfortunately, her granddaughter is going to be delivering another child next month and they will have to move to a different home.  Currently, the patient is not able to care for herself at home and they are trying to figure out if a full-time sitter versus transitioning to assisted living is the best option.      Review of Systems: History obtained from chart review and the patient.  Review of Systems   Constitutional: Positive for fatigue. Negative for fever and unexpected weight change.   HENT: Positive for hearing loss.    Respiratory: Positive for cough, shortness of breath and wheezing.     Cardiovascular: Negative for chest pain and leg swelling.   Neurological: Positive for weakness.     As described in the HPI. Otherwise, remainder of ROS (14 systems) were negative.    Patient Active Problem List   Diagnosis   • Bilateral lower extremity edema   • Muscle spasms of both lower extremities   • Chronic diarrhea   • Memory impairment   • Osteoporosis   • Osteopenia   • Chronic low back pain   • Neuropathy of both upper extremities   • Neuropathy of left upper extremity   • COPD, group B, by GOLD 2017 classification (CMS/HCC)   • Supraventricular tachycardia (CMS/HCC)   • Hypertension   • Angina pectoris (CMS/HCC)   • Spinal stenosis of cervical region   • Anterolisthesis   • B12 deficiency   • Gastroesophageal reflux disease without esophagitis   • Perennial allergic rhinitis   • Supplemental oxygen dependent   • Primary osteoarthritis involving multiple joints   • Generalized anxiety disorder   • Acquired hypothyroidism   • Personal history of tobacco use, presenting hazards to health   • Chronic respiratory failure with hypoxia (CMS/[facility])   • Physical deconditioning         Current Outpatient Medications:   •  albuterol (PROAIR RESPICLICK) 108 (90 BASE) MCG/ACT inhaler, Inhale 1 puff Every 4 (Four) Hours As Needed for Wheezing., Disp: 1 inhaler, Rfl: 12  •  albuterol (PROVENTIL) (2.5 MG/3ML) 0.083% nebulizer solution, Take 2.5 mg by nebulization Every 4 (Four) Hours As Needed for Wheezing or Shortness of Air., Disp: 1 vial, Rfl: 12  •  alendronate (FOSAMAX) 70 MG tablet, TAKE 1 TABLET ONE TIME PER WEEK, Disp: 12 tablet, Rfl: 3  •  atorvastatin (LIPITOR) 80 MG tablet, TAKE 1 TABLET EVERY NIGHT, Disp: 90 tablet, Rfl: 1  •  busPIRone (BUSPAR) 10 MG tablet, TAKE 1 TABLET TWICE A DAY AS NEEDED FOR ANXIETY, Disp: 180 tablet, Rfl: 1  •  Calcium Carb-Cholecalciferol 600-800 MG-UNIT tablet, Take 1 tablet by mouth 2 (two) times a day., Disp: , Rfl:   •  carvedilol (COREG) 3.125 MG tablet, Take 1 tablet by mouth  2 (Two) Times a Day With Meals., Disp: 180 tablet, Rfl: 2  •  celecoxib (CeleBREX) 200 MG capsule, TAKE 1 CAPSULE DAILY, Disp: 90 capsule, Rfl: 1  •  clopidogrel (PLAVIX) 75 MG tablet, TAKE 1 TABLET DAILY, Disp: 90 tablet, Rfl: 1  •  digoxin (LANOXIN) 125 MCG tablet, TAKE 1 TABLET DAILY, Disp: 90 tablet, Rfl: 1  •  diphenoxylate-atropine (LOMOTIL) 2.5-0.025 MG per tablet, 1 TABLET BY MOUTH 2 TIMES A DAY, Disp: 180 tablet, Rfl: 0  •  donepezil (ARICEPT) 10 MG tablet, TAKE 1 TABLET EVERY NIGHT, Disp: 90 tablet, Rfl: 1  •  fluticasone (FLONASE) 50 MCG/ACT nasal spray, 2 sprays into each nostril Daily., Disp: 1 each, Rfl: 3  •  Fluticasone-Umeclidin-Vilant 100-62.5-25 MCG/INH aerosol powder , Inhale 1 each Daily., Disp: 1 each, Rfl: 11  •  furosemide (LASIX) 40 MG tablet, TAKE 1 TABLET TWICE A DAY, Disp: 180 tablet, Rfl: 1  •  isosorbide mononitrate (IMDUR) 60 MG 24 hr tablet, Take 1 tablet by mouth Daily., Disp: 90 tablet, Rfl: 3  •  KLOR-CON 10 MEQ CR tablet, TAKE 1 TABLET DAILY, Disp: 90 tablet, Rfl: 1  •  LEVOXYL 50 MCG tablet, TAKE 1 TABLET DAILY, Disp: 90 tablet, Rfl: 0  •  LORazepam (ATIVAN) 0.5 MG tablet, Take 1 tablet by mouth Every 8 (Eight) Hours As Needed for Anxiety., Disp: 270 tablet, Rfl: 0  •  memantine (NAMENDA XR) 28 MG capsule sustained-release 24 hr extended release capsule, TAKE 1 CAPSULE DAILY, Disp: 90 capsule, Rfl: 1  •  montelukast (SINGULAIR) 10 MG tablet, Take 1 tablet by mouth Every Night., Disp: 90 tablet, Rfl: 0  •  Multiple Vitamins-Minerals (CENTRUM PO), Take 1 tablet by mouth daily., Disp: , Rfl:   •  omeprazole (priLOSEC) 20 MG capsule, TAKE 1 CAPSULE TWICE A DAY, Disp: 180 capsule, Rfl: 3  •  raNITIdine (ZANTAC) 150 MG tablet, TAKE 1 TABLET TWICE A DAY AS NEEDED FOR HEARTBURN OR INDIGESTION, Disp: 180 tablet, Rfl: 1  •  spironolactone (ALDACTONE) 50 MG tablet, TAKE 1 TABLET DAILY, Disp: 90 tablet, Rfl: 1  •  venlafaxine XR (EFFEXOR-XR) 75 MG 24 hr capsule, Take 2 capsules in the  morning and 1 capsule at night, Disp: 270 capsule, Rfl: 0    Current Facility-Administered Medications:   •  cyanocobalamin injection 1,000 mcg, 1,000 mcg, Intramuscular, Q30 Days, May Martin MD, 1,000 mcg at 09/29/17 1126    Allergies   Allergen Reactions   • Bee Venom    • Ceclor [Cefaclor]    • Latex Itching   • Prednisone        Past Medical History:   Diagnosis Date   • Abnormality of nail of toe    • Acute bronchitis, unspecified    • Age-asscioated Memory impairment    • Allergic rhinitis    • Anxiety state    • Backache    • Body mass index (BMI) of 30+ - obesity    • Chronic dermatitis    • Chronic obstructive lung disease (CMS/HCC)    • Chronic respiratory failure with hypoxia (CMS/HCC)    • Cough    • Depressive disorder    • Dermatitis    • Diarrhea    • Edema of lower extremity    • Gastritis    • Generalized anxiety disorder    • Hyperbilirubinemia    • Hyperlipidemia    • Hypothyroidism    • IBS (irritable bowel syndrome)    • Intertrigo    • Memory impairment    • Multiple acquired skin tags    • Need for prophylactic vaccination and inoculation against diphtheria-tetanus-pertussis, combined [DTP    • Needs Influenza immunization    • Osteoarthritis    • Osteopenia    • Other lesions of oral mucosa    • Pain in lower limb    • Seborrheic dermatitis, unspecified    • Seborrheic keratoses    • Shoulder joint pain    • Synovial cyst popliteal space    • Tinea unguium    • Xerosis cutis      Past Surgical History:   Procedure Laterality Date   • CARPAL TUNNEL RELEASE     • ENDOSCOPY AND COLONOSCOPY     • HYSTERECTOMY     • INJECTION OF MEDICATION      Depo Medrol (80mg)   • INJECTION OF MEDICATION      Kenalog (80mg)   • PRE-MALIGNANT / BENIGN SKIN LESION EXCISION     • SKIN TAG REMOVAL     • TONSILLECTOMY       Social History     Socioeconomic History   • Marital status:      Spouse name: Not on file   • Number of children: Not on file   • Years of education: Not on file   • Highest  "education level: Not on file   Tobacco Use   • Smoking status: Former Smoker   • Smokeless tobacco: Never Used   Substance and Sexual Activity   • Alcohol use: No   • Drug use: No   • Sexual activity: Defer     Family History   Problem Relation Age of Onset   • Depression Mother    • Diabetes Sister    • Osteoarthritis Sister    • Osteoporosis Sister    • Depression Brother    • Diabetes Brother    • Heart disease Brother    • Hypertension Brother    • Other Brother         smoking tobacco   • Cancer Brother         stomach   • Tuberculosis Other           Objective     Blood pressure 142/64, pulse 83, height 162.6 cm (64\"), weight 80.7 kg (178 lb), SpO2 94 %.  Physical Exam   Constitutional: She is oriented to person, place, and time. Vital signs are normal. She appears well-developed and well-nourished.   HENT:   Head: Normocephalic and atraumatic.   Nose: Nose normal.   Mouth/Throat: Uvula is midline, oropharynx is clear and moist and mucous membranes are normal.   Mallampati 4   Eyes: Conjunctivae, EOM and lids are normal. Pupils are equal, round, and reactive to light.   Eyeglasses   Neck: Trachea normal and normal range of motion. No tracheal tenderness present. No thyroid mass present.   Cardiovascular: Normal rate, regular rhythm and normal heart sounds. PMI is not displaced. Exam reveals no gallop.   No murmur heard.  Pulmonary/Chest: Effort normal. No respiratory distress. She has no decreased breath sounds. She has wheezes (scattered). She has no rhonchi. She exhibits no tenderness.   Prolonged expiration   Abdominal: Soft. Normal appearance and bowel sounds are normal. There is no hepatomegaly. There is no tenderness.   Musculoskeletal:   In wheelchair, no extremity edema     Vascular Status -  Her right foot exhibits no edema. Her left foot exhibits no edema.  Lymphadenopathy:        Head (right side): No submandibular adenopathy present.        Head (left side): No submandibular adenopathy present.     " She has no cervical adenopathy.        Right: No supraclavicular adenopathy present.        Left: No supraclavicular adenopathy present.   Neurological: She is alert and oriented to person, place, and time.   Skin: Skin is warm and dry. No rash noted. No cyanosis. Nails show no clubbing.   Psychiatric: She has a normal mood and affect. Her speech is normal and behavior is normal. Judgment normal.   Nursing note and vitals reviewed.      PFTs: 1/28/19 (independently reviewed and interpreted by me)  Ratio 45  FVC 2.09/ 86%  FEV1 0.93/ 52%  Moderate obstruction.  No comparative data available.       Assessment/Plan     Gavi was seen today for copd.    Diagnoses and all orders for this visit:    COPD, group B, by GOLD 2017 classification (CMS/[facility])  -     albuterol (PROVENTIL) (2.5 MG/3ML) 0.083% nebulizer solution; Take 2.5 mg by nebulization Every 4 (Four) Hours As Needed for Wheezing or Shortness of Air.    Chronic respiratory failure with hypoxia (CMS/[facility])    Perennial allergic rhinitis    Physical deconditioning    Personal history of tobacco use, presenting hazards to health         Discussion/ Recommendations:   She is currently on triple therapy and is not perceiving much benefit.  I think her dyspnea is primarily related to significant deconditioning which has happened with decreased activity as well as aging.  She had pulmonary function testing which only showed moderate obstruction which does not correlate with her degree of symptoms.  I do not think that continuing scheduled albuterol is to her benefit due to the risk of side effects as well as decreased effectiveness over time.  I do think that if she is frustrated by her current symptoms and level activity she needs to be aggressive with her regular, progressive exercise.  Unfortunately, she will be losing her caregiver next month, but they have not determined how the patient will be able to stay in her home yet.  I did discuss with her that given that  she is already on triple therapy as well as oxygen the next step would likely be considering chronic low-dose steroids, but they certainly come with significant attentional side effects.    -Continue Trelegy daily.  -Continue albuterol MDI or nebulizer only as needed for dyspnea or wheeze.  -Continue supplemental oxygen.  Goal saturation greater than 88%.  -Consider chronic low-dose steroids.  I do not feel the potential benefit would outweigh the risk of side effects at this time.  -Encouraged regular, progressive exercise.  -Annual flu vaccine.    I did spend time counseling the patient and her granddaughter about expectations.  I think the patient is significantly deconditioned and even with regular exercise she may not  recover to functionality that we will allow her to live independently.  I also counseled her that quality of life should be taken to account because if regular exercise is more strenuous and causes distress, it may outweigh the benefits.  I have encouraged him to discuss with their  about options, but I think looking into assisted living may be the most viable one at this time.    The total face to face time spent 32 mins with the patient. 24 mins (>50% of the total time) were spent counseling and in coordination of care on management of COPD, chronic dyspnea, and options for assistance..    Patient's Body mass index is 30.55 kg/m². BMI is above normal parameters. Recommendations include: exercise counseling.           Return in about 6 weeks (around 5/15/2019) for Recheck COPD.      Thank you for allowing me to participate in the care of Gavi Gordon. Please do not hesitate to contact me with any questions.         This document has been electronically signed by Joan Andrews MD on April 3, 2019 4:21 PM      Dictated using Dragon

## 2019-04-03 ENCOUNTER — OFFICE VISIT (OUTPATIENT)
Dept: PULMONOLOGY | Facility: CLINIC | Age: 84
End: 2019-04-03

## 2019-04-03 VITALS
HEART RATE: 83 BPM | SYSTOLIC BLOOD PRESSURE: 142 MMHG | DIASTOLIC BLOOD PRESSURE: 64 MMHG | BODY MASS INDEX: 30.39 KG/M2 | OXYGEN SATURATION: 94 % | HEIGHT: 64 IN | WEIGHT: 178 LBS

## 2019-04-03 DIAGNOSIS — J44.9 COPD, GROUP B, BY GOLD 2017 CLASSIFICATION (HCC): Primary | ICD-10-CM

## 2019-04-03 DIAGNOSIS — Z87.891 PERSONAL HISTORY OF TOBACCO USE, PRESENTING HAZARDS TO HEALTH: ICD-10-CM

## 2019-04-03 DIAGNOSIS — J30.89 PERENNIAL ALLERGIC RHINITIS: ICD-10-CM

## 2019-04-03 DIAGNOSIS — J96.11 CHRONIC RESPIRATORY FAILURE WITH HYPOXIA (HCC): ICD-10-CM

## 2019-04-03 DIAGNOSIS — R53.81 PHYSICAL DECONDITIONING: ICD-10-CM

## 2019-04-03 PROCEDURE — 99214 OFFICE O/P EST MOD 30 MIN: CPT | Performed by: INTERNAL MEDICINE

## 2019-04-03 RX ORDER — ALBUTEROL SULFATE 2.5 MG/3ML
2.5 SOLUTION RESPIRATORY (INHALATION) EVERY 4 HOURS PRN
Qty: 1 VIAL | Refills: 12 | Status: SHIPPED | OUTPATIENT
Start: 2019-04-03 | End: 2019-09-24 | Stop reason: SDUPTHER

## 2019-04-16 RX ORDER — DONEPEZIL HYDROCHLORIDE 10 MG/1
TABLET, FILM COATED ORAL
Qty: 90 TABLET | Refills: 1 | Status: SHIPPED | OUTPATIENT
Start: 2019-04-16 | End: 2019-09-24 | Stop reason: SDUPTHER

## 2019-04-21 DIAGNOSIS — E03.9 ACQUIRED HYPOTHYROIDISM: ICD-10-CM

## 2019-04-22 RX ORDER — RANITIDINE 150 MG/1
TABLET ORAL
Qty: 180 TABLET | Refills: 1 | Status: SHIPPED | OUTPATIENT
Start: 2019-04-22 | End: 2019-09-24 | Stop reason: SDUPTHER

## 2019-04-22 RX ORDER — POTASSIUM CHLORIDE 750 MG/1
TABLET, FILM COATED, EXTENDED RELEASE ORAL
Qty: 90 TABLET | Refills: 1 | Status: SHIPPED | OUTPATIENT
Start: 2019-04-22 | End: 2019-09-24 | Stop reason: SDUPTHER

## 2019-04-22 RX ORDER — LEVOTHYROXINE SODIUM 50 MCG
TABLET ORAL
Qty: 90 TABLET | Refills: 0 | Status: SHIPPED | OUTPATIENT
Start: 2019-04-22 | End: 2019-09-24 | Stop reason: SDUPTHER

## 2019-04-30 RX ORDER — SPIRONOLACTONE 50 MG/1
TABLET, FILM COATED ORAL
Qty: 90 TABLET | Refills: 1 | Status: SHIPPED | OUTPATIENT
Start: 2019-04-30 | End: 2019-09-24 | Stop reason: SDUPTHER

## 2019-05-02 RX ORDER — OMEPRAZOLE 20 MG/1
CAPSULE, DELAYED RELEASE ORAL
Qty: 180 CAPSULE | Refills: 3 | Status: SHIPPED | OUTPATIENT
Start: 2019-05-02 | End: 2019-09-24 | Stop reason: SDUPTHER

## 2019-05-11 DIAGNOSIS — F41.1 GAD (GENERALIZED ANXIETY DISORDER): ICD-10-CM

## 2019-05-11 DIAGNOSIS — I47.1 SUPRAVENTRICULAR TACHYCARDIA (HCC): ICD-10-CM

## 2019-05-11 DIAGNOSIS — M15.9 PRIMARY OSTEOARTHRITIS INVOLVING MULTIPLE JOINTS: ICD-10-CM

## 2019-05-11 DIAGNOSIS — R41.3 MEMORY IMPAIRMENT: ICD-10-CM

## 2019-05-13 RX ORDER — MEMANTINE HYDROCHLORIDE 28 MG/1
CAPSULE, EXTENDED RELEASE ORAL
Qty: 90 CAPSULE | Refills: 1 | Status: SHIPPED | OUTPATIENT
Start: 2019-05-13 | End: 2019-09-24 | Stop reason: SDUPTHER

## 2019-05-13 RX ORDER — CELECOXIB 200 MG/1
CAPSULE ORAL
Qty: 90 CAPSULE | Refills: 1 | Status: SHIPPED | OUTPATIENT
Start: 2019-05-13 | End: 2019-09-24 | Stop reason: SDUPTHER

## 2019-05-13 RX ORDER — BUSPIRONE HYDROCHLORIDE 10 MG/1
TABLET ORAL
Qty: 180 TABLET | Refills: 1 | Status: SHIPPED | OUTPATIENT
Start: 2019-05-13 | End: 2019-09-24 | Stop reason: SDUPTHER

## 2019-05-13 RX ORDER — CLOPIDOGREL BISULFATE 75 MG/1
TABLET ORAL
Qty: 90 TABLET | Refills: 1 | Status: SHIPPED | OUTPATIENT
Start: 2019-05-13 | End: 2019-09-24 | Stop reason: SDUPTHER

## 2019-05-13 RX ORDER — ATORVASTATIN CALCIUM 80 MG/1
TABLET, FILM COATED ORAL
Qty: 90 TABLET | Refills: 1 | Status: SHIPPED | OUTPATIENT
Start: 2019-05-13 | End: 2019-09-24 | Stop reason: SDUPTHER

## 2019-05-13 RX ORDER — DIGOXIN 125 MCG
TABLET ORAL
Qty: 90 TABLET | Refills: 1 | Status: SHIPPED | OUTPATIENT
Start: 2019-05-13 | End: 2019-09-24 | Stop reason: SDUPTHER

## 2019-05-13 RX ORDER — ISOSORBIDE MONONITRATE 60 MG/1
TABLET, EXTENDED RELEASE ORAL
Qty: 90 TABLET | Refills: 3 | Status: SHIPPED | OUTPATIENT
Start: 2019-05-13 | End: 2019-09-24 | Stop reason: SDUPTHER

## 2019-05-23 PROBLEM — E66.09 CLASS 1 OBESITY DUE TO EXCESS CALORIES WITH SERIOUS COMORBIDITY AND BODY MASS INDEX (BMI) OF 30.0 TO 30.9 IN ADULT: Status: ACTIVE | Noted: 2019-05-23

## 2019-05-31 ENCOUNTER — OFFICE VISIT (OUTPATIENT)
Dept: PULMONOLOGY | Facility: CLINIC | Age: 84
End: 2019-05-31

## 2019-05-31 VITALS
HEART RATE: 81 BPM | HEIGHT: 64 IN | BODY MASS INDEX: 30.39 KG/M2 | DIASTOLIC BLOOD PRESSURE: 82 MMHG | WEIGHT: 178 LBS | SYSTOLIC BLOOD PRESSURE: 176 MMHG

## 2019-05-31 DIAGNOSIS — Z87.891 PERSONAL HISTORY OF TOBACCO USE, PRESENTING HAZARDS TO HEALTH: ICD-10-CM

## 2019-05-31 DIAGNOSIS — R53.81 PHYSICAL DECONDITIONING: ICD-10-CM

## 2019-05-31 DIAGNOSIS — J96.11 CHRONIC RESPIRATORY FAILURE WITH HYPOXIA (HCC): ICD-10-CM

## 2019-05-31 DIAGNOSIS — E66.09 CLASS 1 OBESITY DUE TO EXCESS CALORIES WITH SERIOUS COMORBIDITY AND BODY MASS INDEX (BMI) OF 30.0 TO 30.9 IN ADULT: ICD-10-CM

## 2019-05-31 DIAGNOSIS — J44.9 COPD, GROUP B, BY GOLD 2017 CLASSIFICATION (HCC): Chronic | ICD-10-CM

## 2019-05-31 DIAGNOSIS — J44.9 STAGE 2 MODERATE COPD BY GOLD CLASSIFICATION (HCC): Primary | Chronic | ICD-10-CM

## 2019-05-31 DIAGNOSIS — J30.89 PERENNIAL ALLERGIC RHINITIS: ICD-10-CM

## 2019-05-31 PROCEDURE — 99214 OFFICE O/P EST MOD 30 MIN: CPT | Performed by: INTERNAL MEDICINE

## 2019-05-31 NOTE — PROGRESS NOTES
Pulmonary Office Follow-up     Gavi Gordon is seen in the office today for   Chief Complaint   Patient presents with   • COPD   .    Subjective     History of Present Illness  Gavi Gordon is a 84 y.o. female with a PMH significant for COPD, chronic hypoxemic respiratory failure, past tobacco use, allergies, HTN, osteoporosis and GERD who presents for evaluation of COPD and dyspnea.  She was last seen on 4/3/19, at which time she continued to have significant issues which I felt was likely secondary to her level of deconditioning, but I recommended she continue on her Trelegy as well as supplemental oxygen.  I also spent extensive time counseling the patient and her granddaughter regarding social issues, including consideration of placing the patient in an assisted living facility.  She states that her breathing has been stable since her last visit and she has not had any recent exacerbations.  She does continue to get out of breath with exertion but admits she does not exercise.  Patient continues on Trelegy daily but has not been using her albuterol recently.  She does have some cough productive of greenish sputum but denies any changes in the amount or any recent fevers.  She continues on her oxygen at 2 L, but her portable oxygen today is at 3 L.  Initially her saturation was in the 80s, but on recheck it was 98% on 3 L.  Patient does have acrylic nails on which make it difficult to get a good waveform on the pulse oximeter.  She reports that her leg swelling is improved but her weight remains stable.      Review of Systems: History obtained from chart review and the patient.  Review of Systems   Constitutional: Positive for fatigue. Negative for fever and unexpected weight change.   HENT: Positive for hearing loss.    Respiratory: Positive for cough and shortness of breath. Negative for wheezing.    Cardiovascular: Positive for leg swelling. Negative for chest pain.   Neurological: Positive for  weakness.     As described in the HPI. Otherwise, remainder of ROS (14 systems) were negative.    Patient Active Problem List   Diagnosis   • Bilateral lower extremity edema   • Muscle spasms of both lower extremities   • Chronic diarrhea   • Memory impairment   • Osteoporosis   • Osteopenia   • Chronic low back pain   • Neuropathy of both upper extremities   • Neuropathy of left upper extremity   • COPD, group B, by GOLD 2017 classification (CMS/HCC)   • Supraventricular tachycardia (CMS/[facility])   • Hypertension   • Angina pectoris (CMS/HCC)   • Spinal stenosis of cervical region   • Anterolisthesis   • B12 deficiency   • Gastroesophageal reflux disease without esophagitis   • Perennial allergic rhinitis   • Supplemental oxygen dependent   • Primary osteoarthritis involving multiple joints   • Generalized anxiety disorder   • Acquired hypothyroidism   • Personal history of tobacco use, presenting hazards to health   • Chronic respiratory failure with hypoxia (CMS/HCC)   • Physical deconditioning   • Class 1 obesity due to excess calories with serious comorbidity and body mass index (BMI) of 30.0 to 30.9 in adult   • Stage 2 moderate COPD by GOLD classification (CMS/[facility])         Current Outpatient Medications:   •  albuterol (PROAIR RESPICLICK) 108 (90 BASE) MCG/ACT inhaler, Inhale 1 puff Every 4 (Four) Hours As Needed for Wheezing., Disp: 1 inhaler, Rfl: 12  •  albuterol (PROVENTIL) (2.5 MG/3ML) 0.083% nebulizer solution, Take 2.5 mg by nebulization Every 4 (Four) Hours As Needed for Wheezing or Shortness of Air., Disp: 1 vial, Rfl: 12  •  alendronate (FOSAMAX) 70 MG tablet, TAKE 1 TABLET ONE TIME PER WEEK, Disp: 12 tablet, Rfl: 3  •  atorvastatin (LIPITOR) 80 MG tablet, TAKE 1 TABLET EVERY NIGHT, Disp: 90 tablet, Rfl: 1  •  busPIRone (BUSPAR) 10 MG tablet, TAKE 1 TABLET TWICE A DAY AS NEEDED FOR ANXIETY, Disp: 180 tablet, Rfl: 1  •  Calcium Carb-Cholecalciferol 600-800 MG-UNIT tablet, Take 1 tablet by mouth 2 (two)  times a day., Disp: , Rfl:   •  carvedilol (COREG) 3.125 MG tablet, Take 1 tablet by mouth 2 (Two) Times a Day With Meals., Disp: 180 tablet, Rfl: 2  •  celecoxib (CeleBREX) 200 MG capsule, TAKE 1 CAPSULE DAILY, Disp: 90 capsule, Rfl: 1  •  clopidogrel (PLAVIX) 75 MG tablet, TAKE 1 TABLET DAILY, Disp: 90 tablet, Rfl: 1  •  digoxin (LANOXIN) 125 MCG tablet, TAKE 1 TABLET DAILY, Disp: 90 tablet, Rfl: 1  •  diphenoxylate-atropine (LOMOTIL) 2.5-0.025 MG per tablet, 1 TABLET BY MOUTH 2 TIMES A DAY, Disp: 180 tablet, Rfl: 0  •  donepezil (ARICEPT) 10 MG tablet, TAKE 1 TABLET EVERY NIGHT, Disp: 90 tablet, Rfl: 1  •  fluticasone (FLONASE) 50 MCG/ACT nasal spray, 2 sprays into each nostril Daily., Disp: 1 each, Rfl: 3  •  Fluticasone-Umeclidin-Vilant 100-62.5-25 MCG/INH aerosol powder , Inhale 1 each Daily., Disp: 1 each, Rfl: 11  •  furosemide (LASIX) 40 MG tablet, TAKE 1 TABLET TWICE A DAY, Disp: 180 tablet, Rfl: 1  •  isosorbide mononitrate (IMDUR) 60 MG 24 hr tablet, TAKE 1 TABLET DAILY, Disp: 90 tablet, Rfl: 3  •  KLOR-CON 10 MEQ CR tablet, TAKE 1 TABLET DAILY, Disp: 90 tablet, Rfl: 1  •  LORazepam (ATIVAN) 0.5 MG tablet, Take 1 tablet by mouth Every 8 (Eight) Hours As Needed for Anxiety., Disp: 270 tablet, Rfl: 0  •  memantine (NAMENDA XR) 28 MG capsule sustained-release 24 hr extended release capsule, TAKE 1 CAPSULE DAILY, Disp: 90 capsule, Rfl: 1  •  montelukast (SINGULAIR) 10 MG tablet, Take 1 tablet by mouth Every Night., Disp: 90 tablet, Rfl: 0  •  Multiple Vitamins-Minerals (CENTRUM PO), Take 1 tablet by mouth daily., Disp: , Rfl:   •  omeprazole (priLOSEC) 20 MG capsule, TAKE 1 CAPSULE TWICE A DAY, Disp: 180 capsule, Rfl: 3  •  raNITIdine (ZANTAC) 150 MG tablet, TAKE 1 TABLET TWICE A DAY AS NEEDED FOR HEARTBURN OR INDIGESTION, Disp: 180 tablet, Rfl: 1  •  spironolactone (ALDACTONE) 50 MG tablet, TAKE 1 TABLET DAILY, Disp: 90 tablet, Rfl: 1  •  SYNTHROID 50 MCG tablet, TAKE 1 TABLET DAILY, Disp: 90 tablet, Rfl:  0  •  venlafaxine XR (EFFEXOR-XR) 75 MG 24 hr capsule, Take 2 capsules in the morning and 1 capsule at night, Disp: 270 capsule, Rfl: 0    Current Facility-Administered Medications:   •  cyanocobalamin injection 1,000 mcg, 1,000 mcg, Intramuscular, Q30 Days, May Martin MD, 1,000 mcg at 09/29/17 1126    Allergies   Allergen Reactions   • Bee Venom    • Ceclor [Cefaclor]    • Latex Itching   • Prednisone        Past Medical History:   Diagnosis Date   • Abnormality of nail of toe    • Acute bronchitis, unspecified    • Age-asscioated Memory impairment    • Allergic rhinitis    • Anxiety state    • Backache    • Body mass index (BMI) of 30+ - obesity    • Chronic dermatitis    • Chronic obstructive lung disease (CMS/HCC)    • Chronic respiratory failure with hypoxia (CMS/HCC)    • Cough    • Depressive disorder    • Dermatitis    • Diarrhea    • Edema of lower extremity    • Gastritis    • Generalized anxiety disorder    • Hyperbilirubinemia    • Hyperlipidemia    • Hypothyroidism    • IBS (irritable bowel syndrome)    • Intertrigo    • Memory impairment    • Multiple acquired skin tags    • Need for prophylactic vaccination and inoculation against diphtheria-tetanus-pertussis, combined [DTP    • Needs Influenza immunization    • Osteoarthritis    • Osteopenia    • Other lesions of oral mucosa    • Pain in lower limb    • Seborrheic dermatitis, unspecified    • Seborrheic keratoses    • Shoulder joint pain    • Synovial cyst popliteal space    • Tinea unguium    • Xerosis cutis      Past Surgical History:   Procedure Laterality Date   • CARPAL TUNNEL RELEASE     • ENDOSCOPY AND COLONOSCOPY     • HYSTERECTOMY     • INJECTION OF MEDICATION      Depo Medrol (80mg)   • INJECTION OF MEDICATION      Kenalog (80mg)   • PRE-MALIGNANT / BENIGN SKIN LESION EXCISION     • SKIN TAG REMOVAL     • TONSILLECTOMY       Social History     Socioeconomic History   • Marital status:      Spouse name: Not on file   •  "Number of children: Not on file   • Years of education: Not on file   • Highest education level: Not on file   Tobacco Use   • Smoking status: Former Smoker   • Smokeless tobacco: Never Used   Substance and Sexual Activity   • Alcohol use: No   • Drug use: No   • Sexual activity: Defer     Family History   Problem Relation Age of Onset   • Depression Mother    • Diabetes Sister    • Osteoarthritis Sister    • Osteoporosis Sister    • Depression Brother    • Diabetes Brother    • Heart disease Brother    • Hypertension Brother    • Other Brother         smoking tobacco   • Cancer Brother         stomach   • Tuberculosis Other           Objective     Blood pressure 176/82, pulse 81, height 162.6 cm (64\"), weight 80.7 kg (178 lb).   98% on 3lpm O2    Physical Exam   Constitutional: She is oriented to person, place, and time. Vital signs are normal. She appears well-developed and well-nourished.   HENT:   Head: Normocephalic and atraumatic.   Nose: Nose normal.   Mouth/Throat: Uvula is midline, oropharynx is clear and moist and mucous membranes are normal.   Mallampati 4   Eyes: Conjunctivae, EOM and lids are normal. Pupils are equal, round, and reactive to light.   Eyeglasses   Neck: Trachea normal and normal range of motion. No tracheal tenderness present. No thyroid mass present.   Cardiovascular: Normal rate, regular rhythm and normal heart sounds. PMI is not displaced. Exam reveals no gallop.   No murmur heard.  Pulmonary/Chest: Effort normal. No respiratory distress. She has no decreased breath sounds. She has wheezes (scattered) in the right upper field and the right middle field. She has no rhonchi. She exhibits no tenderness.   Prolonged expiration   Abdominal: Soft. Normal appearance and bowel sounds are normal. There is no hepatomegaly. There is no tenderness.   Musculoskeletal:   In wheelchair, mild BLE edema     Vascular Status -  Her right foot exhibits abnormal foot edema. Her left foot exhibits abnormal " foot edema.  Lymphadenopathy:        Head (right side): No submandibular adenopathy present.        Head (left side): No submandibular adenopathy present.     She has no cervical adenopathy.        Right: No supraclavicular adenopathy present.        Left: No supraclavicular adenopathy present.   Neurological: She is alert and oriented to person, place, and time.   Skin: Skin is warm and dry. No rash noted. No cyanosis. Nails show no clubbing.   Psychiatric: She has a normal mood and affect. Her speech is normal and behavior is normal. Judgment normal.   Nursing note and vitals reviewed.      PFTs: 1/28/19 (independently reviewed and interpreted by me)  Ratio 45  FVC 2.09/ 86%  FEV1 0.93/ 52%  Moderate obstruction.  No comparative data available.       Assessment/Plan     Gavi was seen today for copd.    Diagnoses and all orders for this visit:    Stage 2 moderate COPD by GOLD classification (CMS/HCC)    COPD, group B, by GOLD 2017 classification (CMS/HCC)    Perennial allergic rhinitis    Chronic respiratory failure with hypoxia (CMS/HCC)    Class 1 obesity due to excess calories with serious comorbidity and body mass index (BMI) of 30.0 to 30.9 in adult    Physical deconditioning    Personal history of tobacco use, presenting hazards to health         Discussion/ Recommendations:   She is stable symptomatically but is having some increased wheeze likely related to chronic bronchitis.  I do not feel that steroids or antibiotics are necessary at this time given she is relatively asymptomatic, but I have suggested that she use her nebulizer twice daily for the next 2 days.  Otherwise I recommended she use her oxygen at 2 L as her saturations were adequate.  I did  her that her artificial nails do make it difficult for the pulse oximeter to  a good waveform and given accurate saturation.    -Recommended she use her albuterol nebs twice daily for the next few days, and she can go back to as  needed  -Continue Trelegy daily.  -Continue supplemental oxygen 2lpm.  Goal saturation greater than 88%.  Suggested they turn off her home concentrator when she will be out of the house to stay with electricity.  -If she has increased cough, sputum production, dyspnea, or fevers, she is to call and we will send him scription for steroids as well as an antibiotic.  -Encouraged regular, progressive exercise.  -Up-to-date with pneumonia vaccine      Patient's Body mass index is 30.55 kg/m². BMI is above normal parameters. Recommendations include: exercise counseling.           Return in about 3 months (around 8/31/2019) for Recheck COPD.      Thank you for allowing me to participate in the care of Gavi NELSON Evan. Please do not hesitate to contact me with any questions.         This document has been electronically signed by Joan Andrews MD on May 31, 2019 1:57 PM      Dictated using Dragon

## 2019-06-19 RX ORDER — VENLAFAXINE HYDROCHLORIDE 75 MG/1
CAPSULE, EXTENDED RELEASE ORAL
Qty: 270 CAPSULE | Refills: 0 | Status: SHIPPED | OUTPATIENT
Start: 2019-06-19 | End: 2019-09-24 | Stop reason: SDUPTHER

## 2019-06-24 ENCOUNTER — OFFICE VISIT (OUTPATIENT)
Dept: FAMILY MEDICINE CLINIC | Facility: CLINIC | Age: 84
End: 2019-06-24

## 2019-06-24 VITALS
BODY MASS INDEX: 31.24 KG/M2 | WEIGHT: 183 LBS | SYSTOLIC BLOOD PRESSURE: 130 MMHG | TEMPERATURE: 97.1 F | HEART RATE: 77 BPM | HEIGHT: 64 IN | OXYGEN SATURATION: 93 % | DIASTOLIC BLOOD PRESSURE: 80 MMHG

## 2019-06-24 DIAGNOSIS — F41.1 GENERALIZED ANXIETY DISORDER: Primary | ICD-10-CM

## 2019-06-24 DIAGNOSIS — E78.5 HYPERLIPIDEMIA, UNSPECIFIED HYPERLIPIDEMIA TYPE: ICD-10-CM

## 2019-06-24 DIAGNOSIS — E03.9 ACQUIRED HYPOTHYROIDISM: ICD-10-CM

## 2019-06-24 DIAGNOSIS — Z99.81 SUPPLEMENTAL OXYGEN DEPENDENT: ICD-10-CM

## 2019-06-24 DIAGNOSIS — E66.09 CLASS 1 OBESITY DUE TO EXCESS CALORIES WITH SERIOUS COMORBIDITY AND BODY MASS INDEX (BMI) OF 31.0 TO 31.9 IN ADULT: ICD-10-CM

## 2019-06-24 DIAGNOSIS — I10 ESSENTIAL HYPERTENSION: ICD-10-CM

## 2019-06-24 DIAGNOSIS — M81.6 LOCALIZED OSTEOPOROSIS WITHOUT CURRENT PATHOLOGICAL FRACTURE: Chronic | ICD-10-CM

## 2019-06-24 DIAGNOSIS — Z51.81 THERAPEUTIC DRUG MONITORING: ICD-10-CM

## 2019-06-24 LAB
BACTERIA UR QL AUTO: ABNORMAL /HPF
BILIRUB UR QL STRIP: NEGATIVE
CLARITY UR: CLEAR
COLOR UR: YELLOW
GLUCOSE UR STRIP-MCNC: NEGATIVE MG/DL
HGB UR QL STRIP.AUTO: ABNORMAL
HYALINE CASTS UR QL AUTO: ABNORMAL /LPF
KETONES UR QL STRIP: NEGATIVE
LEUKOCYTE ESTERASE UR QL STRIP.AUTO: NEGATIVE
NITRITE UR QL STRIP: NEGATIVE
PH UR STRIP.AUTO: 6 [PH] (ref 5.5–8)
PROT UR QL STRIP: NEGATIVE
RBC # UR: ABNORMAL /HPF
REF LAB TEST METHOD: ABNORMAL
SP GR UR STRIP: 1.01 (ref 1–1.03)
SQUAMOUS #/AREA URNS HPF: ABNORMAL /HPF
UROBILINOGEN UR QL STRIP: ABNORMAL
WBC UR QL AUTO: ABNORMAL /HPF

## 2019-06-24 PROCEDURE — G0481 DRUG TEST DEF 8-14 CLASSES: HCPCS | Performed by: FAMILY MEDICINE

## 2019-06-24 PROCEDURE — 99214 OFFICE O/P EST MOD 30 MIN: CPT | Performed by: FAMILY MEDICINE

## 2019-06-24 PROCEDURE — 80307 DRUG TEST PRSMV CHEM ANLYZR: CPT | Performed by: FAMILY MEDICINE

## 2019-06-24 PROCEDURE — 81001 URINALYSIS AUTO W/SCOPE: CPT | Performed by: FAMILY MEDICINE

## 2019-06-24 RX ORDER — CARVEDILOL 3.12 MG/1
3.12 TABLET ORAL 2 TIMES DAILY WITH MEALS
Qty: 180 TABLET | Refills: 0 | Status: SHIPPED | OUTPATIENT
Start: 2019-06-24 | End: 2019-09-24 | Stop reason: SDUPTHER

## 2019-06-24 RX ORDER — LORAZEPAM 0.5 MG/1
0.5 TABLET ORAL EVERY 8 HOURS PRN
Qty: 270 TABLET | Refills: 0 | Status: SHIPPED | OUTPATIENT
Start: 2019-06-24 | End: 2019-09-24 | Stop reason: SDUPTHER

## 2019-06-24 NOTE — PROGRESS NOTES
"Subjective   Chief Complaint   Patient presents with   • Anxiety     Gavi Gordon is a 84 y.o. female.   Anxiety    History of Present Illness     Hypertension - managed with coreg, lanoxin, aldactone, lasix    Copd - currently stable  Followed by Dr Andrews  Currently managed with advair, albuterol PRN, nebulizer PRN  She additionally has supplemental oxygen at 2LPM by [place]  Denies any issues today    Anxiety - managed with ativan TID PRN  Denies sedation or impairment with use    Hypothyroidism - due for lab work    Hyperlipidemia - managed with lipitor  Due for lipid panel    The following portions of the patient's history were reviewed and updated as appropriate: allergies, current medications, past family history, past medical history, past social history, past surgical history and problem list.    Review of Systems   Constitutional: Negative for appetite change, chills, fatigue and fever.   HENT: Negative for congestion, ear pain, rhinorrhea and sore throat.    Eyes: Negative for pain.   Respiratory: Negative for cough and shortness of breath.    Cardiovascular: Negative for chest pain and palpitations.   Gastrointestinal: Negative for abdominal pain, constipation, diarrhea and nausea.   Genitourinary: Negative for dysuria.   Musculoskeletal: Negative for back pain, joint swelling and neck pain.   Skin: Negative for rash.   Neurological: Negative for dizziness and headaches.       Objective   /80   Pulse 77   Temp 97.1 °F (36.2 °C) (Oral)   Ht 162.6 cm (64\")   Wt 83 kg (183 lb)   SpO2 93%   BMI 31.41 kg/m²   Physical Exam   Constitutional: She is oriented to person, place, and time. She appears well-developed and well-nourished.   HENT:   Head: Normocephalic and atraumatic.   Eyes: Pupils are equal, round, and reactive to light.   Neck: Normal range of motion. Neck supple.   Cardiovascular: Normal rate, regular rhythm and normal heart sounds.   Pulmonary/Chest: Effort normal and breath sounds " normal. No respiratory distress. She has no wheezes. She has no rales.   Abdominal: Soft. Bowel sounds are normal.   Musculoskeletal: Normal range of motion.   Neurological: She is alert and oriented to person, place, and time.   Skin: Skin is warm and dry.   Psychiatric: She has a normal mood and affect.   Nursing note and vitals reviewed.      Assessment/Plan   Problems Addressed this Visit        Cardiovascular and Mediastinum    Hypertension    Relevant Medications    carvedilol (COREG) 3.125 MG tablet    Other Relevant Orders    Lipid panel    Urinalysis With Culture If Indicated - Urine, Clean Catch (Completed)    CBC & Differential    Comprehensive Metabolic Panel    Urinalysis, Microscopic Only - Urine, Clean Catch (Completed)       Respiratory    Supplemental oxygen dependent       Digestive    Class 1 obesity due to excess calories with serious comorbidity and body mass index (BMI) of 31.0 to 31.9 in adult       Endocrine    Acquired hypothyroidism    Relevant Medications    carvedilol (COREG) 3.125 MG tablet    Other Relevant Orders    TSH       Musculoskeletal and Integument    Osteoporosis (Chronic)    Relevant Orders    DEXA Bone Density Axial       Other    Generalized anxiety disorder - Primary    Relevant Medications    LORazepam (ATIVAN) 0.5 MG tablet      Other Visit Diagnoses     Therapeutic drug monitoring        Relevant Orders    ToxASSURE Select 13 (MW) - Urine, Clean Catch    Hyperlipidemia, unspecified hyperlipidemia type            Bone density ordered and scheduled    Patient's Body mass index is 31.41 kg/m². BMI is above normal parameters. Recommendations include: nutrition counseling.    The patient has read and signed the [facility] Controlled Substance Contract.  I will continue to see patient for regular follow up appointments.  They are well controlled on their medication.  BEVERLY is updated every 3 months. The patient is aware of the potential for addiction and  dependence.  [facility] ativan - [facility]  miguel reviewed [id number]    Lab work ordered    Recheck in 3 months - referred to Crystal Ospina to establish care

## 2019-06-29 LAB — CONV REPORT SUMMARY: NORMAL

## 2019-07-05 DIAGNOSIS — E03.9 ACQUIRED HYPOTHYROIDISM: ICD-10-CM

## 2019-07-05 RX ORDER — LEVOTHYROXINE SODIUM 50 MCG
TABLET ORAL
Qty: 90 TABLET | Refills: 0 | OUTPATIENT
Start: 2019-07-05

## 2019-08-01 RX ORDER — ALENDRONATE SODIUM 70 MG/1
TABLET ORAL
Qty: 12 TABLET | Refills: 0 | Status: SHIPPED | OUTPATIENT
Start: 2019-08-01 | End: 2019-09-24 | Stop reason: SDUPTHER

## 2019-08-09 DIAGNOSIS — I10 ESSENTIAL HYPERTENSION: ICD-10-CM

## 2019-08-09 RX ORDER — FUROSEMIDE 40 MG/1
40 TABLET ORAL 2 TIMES DAILY
Qty: 180 TABLET | Refills: 1 | Status: SHIPPED | OUTPATIENT
Start: 2019-08-09 | End: 2019-09-24 | Stop reason: SDUPTHER

## 2019-09-10 ENCOUNTER — OFFICE VISIT (OUTPATIENT)
Dept: FAMILY MEDICINE CLINIC | Facility: CLINIC | Age: 84
End: 2019-09-10

## 2019-09-10 VITALS
HEART RATE: 97 BPM | RESPIRATION RATE: 18 BRPM | BODY MASS INDEX: 30.9 KG/M2 | WEIGHT: 181 LBS | SYSTOLIC BLOOD PRESSURE: 118 MMHG | HEIGHT: 64 IN | TEMPERATURE: 97 F | OXYGEN SATURATION: 94 % | DIASTOLIC BLOOD PRESSURE: 62 MMHG

## 2019-09-10 DIAGNOSIS — R05.9 COUGH: ICD-10-CM

## 2019-09-10 DIAGNOSIS — J44.1 ACUTE EXACERBATION OF CHRONIC OBSTRUCTIVE PULMONARY DISEASE (COPD) (HCC): Primary | ICD-10-CM

## 2019-09-10 PROCEDURE — 96372 THER/PROPH/DIAG INJ SC/IM: CPT | Performed by: PHYSICIAN ASSISTANT

## 2019-09-10 PROCEDURE — 99213 OFFICE O/P EST LOW 20 MIN: CPT | Performed by: PHYSICIAN ASSISTANT

## 2019-09-10 RX ORDER — DOXYCYCLINE HYCLATE 100 MG/1
100 CAPSULE ORAL 2 TIMES DAILY
Qty: 20 CAPSULE | Refills: 0 | Status: SHIPPED | OUTPATIENT
Start: 2019-09-10 | End: 2019-09-24

## 2019-09-10 RX ORDER — GUAIFENESIN 600 MG/1
1200 TABLET, EXTENDED RELEASE ORAL 2 TIMES DAILY
Qty: 20 TABLET | Refills: 0 | Status: SHIPPED | OUTPATIENT
Start: 2019-09-10 | End: 2019-09-24

## 2019-09-10 RX ORDER — BENZONATATE 200 MG/1
200 CAPSULE ORAL 3 TIMES DAILY PRN
Qty: 60 CAPSULE | Refills: 0 | Status: SHIPPED | OUTPATIENT
Start: 2019-09-10

## 2019-09-10 RX ORDER — METHYLPREDNISOLONE ACETATE 80 MG/ML
80 INJECTION, SUSPENSION INTRA-ARTICULAR; INTRALESIONAL; INTRAMUSCULAR; SOFT TISSUE ONCE
Status: COMPLETED | OUTPATIENT
Start: 2019-09-10 | End: 2019-09-10

## 2019-09-10 RX ADMIN — METHYLPREDNISOLONE ACETATE 80 MG: 80 INJECTION, SUSPENSION INTRA-ARTICULAR; INTRALESIONAL; INTRAMUSCULAR; SOFT TISSUE at 14:38

## 2019-09-10 NOTE — PROGRESS NOTES
Subjective   Gavi Gordon is a 85 y.o. female.   Pt is new to me.   History of Present Illness   Pt presents today for a sick visit. Admits to feeling unwell x 1 week. Admits to shortness of breath worse than baseline, nonproductive cough which is worse than baseline, rhinorrhea, sore throat, nasal congestion. Currently prescribed trelegy, albuterol inhaler/albuterol neb prn. Currently prescribed and wearing 2L oxygen via nasal cannula.     Denies fever, chills, myalgias, bilateral ear pain/pressure.       The following portions of the patient's history were reviewed and updated as appropriate: allergies, current medications, past family history, past medical history, past social history, past surgical history and problem list.    Review of Systems   Constitutional: Positive for activity change and fatigue. Negative for appetite change, chills, diaphoresis, fever, unexpected weight gain and unexpected weight loss.   HENT: Positive for congestion, hearing loss, postnasal drip, rhinorrhea and sore throat. Negative for sinus pressure, sneezing, trouble swallowing and voice change.    Respiratory: Positive for cough (nonproductive), shortness of breath (worse than baseline) and wheezing. Negative for apnea, choking, chest tightness and stridor.    Cardiovascular: Negative for chest pain, palpitations and leg swelling.   Gastrointestinal: Negative for abdominal distention, abdominal pain, constipation, diarrhea, nausea, vomiting, GERD and indigestion.   Musculoskeletal: Positive for arthralgias, gait problem (ambulates via wheelchair) and myalgias.   Skin: Negative.    Neurological: Negative for dizziness, weakness, light-headedness, headache and confusion.   Psychiatric/Behavioral: Negative.        Objective   Physical Exam   Constitutional: She is oriented to person, place, and time. Vital signs are normal. She appears well-developed and well-nourished. She is active and cooperative. She has a sickly appearance. No  distress.   HENT:   Head: Normocephalic and atraumatic.   Right Ear: Tympanic membrane, external ear and ear canal normal. No middle ear effusion. Decreased hearing is noted.   Left Ear: Tympanic membrane, external ear and ear canal normal.  No middle ear effusion. Decreased hearing is noted.   Nose: Rhinorrhea and congestion present. Right sinus exhibits no maxillary sinus tenderness and no frontal sinus tenderness. Left sinus exhibits no maxillary sinus tenderness and no frontal sinus tenderness.   Mouth/Throat: Uvula is midline and mucous membranes are normal. Posterior oropharyngeal erythema present. No oropharyngeal exudate, posterior oropharyngeal edema or tonsillar abscesses. Tonsils are 0 on the right. Tonsils are 0 on the left. No tonsillar exudate.   Eyes: Conjunctivae and EOM are normal. Pupils are equal, round, and reactive to light.   Neck: Normal range of motion. Neck supple.   Cardiovascular: Normal rate, regular rhythm and normal heart sounds. Exam reveals no gallop and no friction rub.   No murmur heard.  Pulmonary/Chest: Effort normal. No stridor. No respiratory distress. She has decreased breath sounds (bilateral, chronic lung changes). She has wheezes (bilateral). She has no rhonchi. She has no rales. She exhibits no tenderness.   Abdominal: Soft. Bowel sounds are normal.   Musculoskeletal:   Evaluated in wheelchair.   Lymphadenopathy:     She has cervical adenopathy.   Neurological: She is alert and oriented to person, place, and time.   Skin: Skin is warm and dry. Capillary refill takes less than 2 seconds. She is not diaphoretic.   Psychiatric: She has a normal mood and affect. Her behavior is normal. Judgment and thought content normal.   Nursing note and vitals reviewed.    Vitals:    09/10/19 1324   BP: 118/62   Pulse: 97   Resp: 18   Temp: 97 °F (36.1 °C)   SpO2: 94%        Assessment/Plan   Gavi was seen today for cough.    Diagnoses and all orders for this visit:    Acute exacerbation  of chronic obstructive pulmonary disease (COPD) (CMS/[facility])  -     benzonatate (TESSALON) 200 MG capsule; Take 1 capsule by mouth 3 (Three) Times a Day As Needed for Cough.  -     methylPREDNISolone acetate (DEPO-medrol) injection 80 mg  -     doxycycline (VIBRAMYCIN) 100 MG capsule; Take 1 capsule by mouth 2 (Two) Times a Day.  -     XR Chest 2 View; Future  -     guaiFENesin (MUCINEX) 600 MG 12 hr tablet; Take 2 tablets by mouth 2 (Two) Times a Day.    Cough  -     XR Chest 2 View; Future      Acute copd exacerbation - xr chest as above ordered for evaluation - will call pt with results when received & address appropriately. Continue trelegy, albuterol inhaler/neb q 4-6 hours prn, 2L supplemental oxygen. Depo-medrol IM injection as above given to pt in office. Begin doxycycline, mucinex, & tessalon perles as above.     Patient and guardian educated to follow up sooner than next scheduled appointment if symptoms worsen or do not improve. Patient and guardian stated understanding and has agreed with plan of care. After visit summary was printed and given to patient.      This document has been electronically signed by Zuly Aparicio PA-C on September 23, 2019 10:17 AM,.

## 2019-09-16 DIAGNOSIS — M81.6 LOCALIZED OSTEOPOROSIS WITHOUT CURRENT PATHOLOGICAL FRACTURE: Primary | ICD-10-CM

## 2019-09-18 NOTE — PROGRESS NOTES
Pulmonary Office Follow-up     Gavi Gordon is seen in the office today for   Chief Complaint   Patient presents with   • COPD   .    Subjective     History of Present Illness  Gavi Gordon is a 85 y.o. female with a PMH significant for COPD, chronic hypoxemic respiratory failure, past tobacco use, allergies, HTN, osteoporosis and GERD who presents for evaluation of COPD and dyspnea.      She was last seen on 5/31/19, at which time she was stable on Trelegy, and encouraged her to use her albuterol nebs more often for congestion and continue on supplemental oxygen.  She states that she had a chest cold last week so she went to her new PCP who gave her a methylprednisolone injection and a 10-day course of doxycycline.  She feels like it has helped, but she continues to have a congested cough.  She reports that her sputum ranges from clear to a light yellow, but she denies any fevers or chills.  Her dyspnea is at baseline but she still has some wheeze.  Patient is using Trelegy daily but has not needed her albuterol in the past few days.  She did use her nebulizer for a few days after seeing her PCP but she has gone back to just using as needed.  She denies any significant allergy symptoms or nasal congestion at this time.      Tobacco use history:  Type: cigarettes  Amount: 1 ppd  Duration: 51 years  Cessation: 1999   Willing to quit: N/A      Review of Systems: History obtained from chart review and the patient.  Review of Systems   Constitutional: Positive for fatigue. Negative for fever and unexpected weight change.   HENT: Positive for hearing loss.    Respiratory: Positive for cough, shortness of breath and wheezing.    Cardiovascular: Positive for leg swelling. Negative for chest pain.     As described in the HPI. Otherwise, remainder of ROS (14 systems) were negative.    Patient Active Problem List   Diagnosis   • Bilateral lower extremity edema   • Muscle spasms of both lower extremities   • Chronic  diarrhea   • Memory impairment   • Osteoporosis   • Osteopenia   • Chronic low back pain   • Neuropathy of both upper extremities   • Neuropathy of left upper extremity   • COPD, group B, by GOLD 2017 classification (CMS/[facility])   • Supraventricular tachycardia (CMS/[facility])   • Hypertension   • Angina pectoris (CMS/[facility])   • Spinal stenosis of cervical region   • Anterolisthesis   • B12 deficiency   • Gastroesophageal reflux disease without esophagitis   • Perennial allergic rhinitis   • Supplemental oxygen dependent   • Primary osteoarthritis involving multiple joints   • Generalized anxiety disorder   • Acquired hypothyroidism   • Personal history of tobacco use, presenting hazards to health   • Chronic respiratory failure with hypoxia (CMS/[facility])   • Physical deconditioning   • Class 1 obesity due to excess calories with serious comorbidity and body mass index (BMI) of 31.0 to 31.9 in adult   • Stage 2 moderate COPD by GOLD classification (CMS/[facility])         Current Outpatient Medications:   •  albuterol (PROAIR RESPICLICK) 108 (90 BASE) MCG/ACT inhaler, Inhale 1 puff Every 4 (Four) Hours As Needed for Wheezing., Disp: 1 inhaler, Rfl: 12  •  albuterol (PROVENTIL) (2.5 MG/3ML) 0.083% nebulizer solution, Take 2.5 mg by nebulization Every 4 (Four) Hours As Needed for Wheezing or Shortness of Air., Disp: 1 vial, Rfl: 12  •  alendronate (FOSAMAX) 70 MG tablet, TAKE 1 TABLET ONE TIME PER WEEK, Disp: 12 tablet, Rfl: 0  •  atorvastatin (LIPITOR) 80 MG tablet, TAKE 1 TABLET EVERY NIGHT, Disp: 90 tablet, Rfl: 1  •  benzonatate (TESSALON) 200 MG capsule, Take 1 capsule by mouth 3 (Three) Times a Day As Needed for Cough., Disp: 60 capsule, Rfl: 0  •  busPIRone (BUSPAR) 10 MG tablet, TAKE 1 TABLET TWICE A DAY AS NEEDED FOR ANXIETY, Disp: 180 tablet, Rfl: 1  •  Calcium Carb-Cholecalciferol 600-800 MG-UNIT tablet, Take 1 tablet by mouth 2 (two) times a day., Disp: , Rfl:   •  carvedilol (COREG) 3.125 MG tablet, Take 1 tablet by mouth 2  (Two) Times a Day With Meals., Disp: 180 tablet, Rfl: 0  •  celecoxib (CeleBREX) 200 MG capsule, TAKE 1 CAPSULE DAILY, Disp: 90 capsule, Rfl: 1  •  clopidogrel (PLAVIX) 75 MG tablet, TAKE 1 TABLET DAILY, Disp: 90 tablet, Rfl: 1  •  digoxin (LANOXIN) 125 MCG tablet, TAKE 1 TABLET DAILY, Disp: 90 tablet, Rfl: 1  •  diphenoxylate-atropine (LOMOTIL) 2.5-0.025 MG per tablet, 1 TABLET BY MOUTH 2 TIMES A DAY, Disp: 180 tablet, Rfl: 0  •  donepezil (ARICEPT) 10 MG tablet, TAKE 1 TABLET EVERY NIGHT, Disp: 90 tablet, Rfl: 1  •  doxycycline (VIBRAMYCIN) 100 MG capsule, Take 1 capsule by mouth 2 (Two) Times a Day., Disp: 20 capsule, Rfl: 0  •  fluticasone (FLONASE) 50 MCG/ACT nasal spray, 2 sprays into each nostril Daily., Disp: 1 each, Rfl: 3  •  Fluticasone-Umeclidin-Vilant 100-62.5-25 MCG/INH aerosol powder , Inhale 1 each Daily., Disp: 1 each, Rfl: 11  •  furosemide (LASIX) 40 MG tablet, Take 1 tablet by mouth 2 (Two) Times a Day., Disp: 180 tablet, Rfl: 1  •  guaiFENesin (MUCINEX) 600 MG 12 hr tablet, Take 2 tablets by mouth 2 (Two) Times a Day., Disp: 20 tablet, Rfl: 0  •  isosorbide mononitrate (IMDUR) 60 MG 24 hr tablet, TAKE 1 TABLET DAILY, Disp: 90 tablet, Rfl: 3  •  KLOR-CON 10 MEQ CR tablet, TAKE 1 TABLET DAILY, Disp: 90 tablet, Rfl: 1  •  LORazepam (ATIVAN) 0.5 MG tablet, Take 1 tablet by mouth Every 8 (Eight) Hours As Needed for Anxiety., Disp: 270 tablet, Rfl: 0  •  memantine (NAMENDA XR) 28 MG capsule sustained-release 24 hr extended release capsule, TAKE 1 CAPSULE DAILY, Disp: 90 capsule, Rfl: 1  •  montelukast (SINGULAIR) 10 MG tablet, Take 1 tablet by mouth Every Night., Disp: 90 tablet, Rfl: 0  •  Multiple Vitamins-Minerals (CENTRUM PO), Take 1 tablet by mouth daily., Disp: , Rfl:   •  omeprazole (priLOSEC) 20 MG capsule, TAKE 1 CAPSULE TWICE A DAY, Disp: 180 capsule, Rfl: 3  •  raNITIdine (ZANTAC) 150 MG tablet, TAKE 1 TABLET TWICE A DAY AS NEEDED FOR HEARTBURN OR INDIGESTION, Disp: 180 tablet, Rfl: 1  •   spironolactone (ALDACTONE) 50 MG tablet, TAKE 1 TABLET DAILY, Disp: 90 tablet, Rfl: 1  •  SYNTHROID 50 MCG tablet, TAKE 1 TABLET DAILY, Disp: 90 tablet, Rfl: 0  •  venlafaxine XR (EFFEXOR-XR) 75 MG 24 hr capsule, TAKE 2 CAPSULES IN THE MORNING AND 1 CAPSULE AT NIGHT, Disp: 270 capsule, Rfl: 0  •  predniSONE (DELTASONE) 20 MG tablet, Take 2 tablets by mouth Daily for 5 days., Disp: 10 tablet, Rfl: 0    Current Facility-Administered Medications:   •  cyanocobalamin injection 1,000 mcg, 1,000 mcg, Intramuscular, Q30 Days, May Martin MD, 1,000 mcg at 09/29/17 1126    Allergies   Allergen Reactions   • Bee Venom    • Ceclor [Cefaclor]    • Latex Itching       Past Medical History:   Diagnosis Date   • Abnormality of nail of toe    • Acute bronchitis, unspecified    • Age-asscioated Memory impairment    • Allergic rhinitis    • Anxiety state    • Backache    • Body mass index (BMI) of 30+ - obesity    • Chronic dermatitis    • Chronic obstructive lung disease (CMS/HCC)    • Chronic respiratory failure with hypoxia (CMS/HCC)    • Cough    • Depressive disorder    • Dermatitis    • Diarrhea    • Edema of lower extremity    • Gastritis    • Generalized anxiety disorder    • Hyperbilirubinemia    • Hyperlipidemia    • Hypothyroidism    • IBS (irritable bowel syndrome)    • Intertrigo    • Memory impairment    • Multiple acquired skin tags    • Need for prophylactic vaccination and inoculation against diphtheria-tetanus-pertussis, combined [DTP    • Needs Influenza immunization    • Osteoarthritis    • Osteopenia    • Other lesions of oral mucosa    • Pain in lower limb    • Seborrheic dermatitis, unspecified    • Seborrheic keratoses    • Shoulder joint pain    • Synovial cyst popliteal space    • Tinea unguium    • Xerosis cutis      Past Surgical History:   Procedure Laterality Date   • CARPAL TUNNEL RELEASE     • ENDOSCOPY AND COLONOSCOPY     • HYSTERECTOMY     • INJECTION OF MEDICATION      Depo Medrol (80mg)  "  • INJECTION OF MEDICATION      Kenalog (80mg)   • PRE-MALIGNANT / BENIGN SKIN LESION EXCISION     • SKIN TAG REMOVAL     • TONSILLECTOMY       Social History     Socioeconomic History   • Marital status:      Spouse name: Not on file   • Number of children: Not on file   • Years of education: Not on file   • Highest education level: Not on file   Tobacco Use   • Smoking status: Former Smoker   • Smokeless tobacco: Never Used   Substance and Sexual Activity   • Alcohol use: No   • Drug use: No   • Sexual activity: Defer     Family History   Problem Relation Age of Onset   • Depression Mother    • Diabetes Sister    • Osteoarthritis Sister    • Osteoporosis Sister    • Depression Brother    • Diabetes Brother    • Heart disease Brother    • Hypertension Brother    • Other Brother         smoking tobacco   • Cancer Brother         stomach   • Tuberculosis Other           Objective     Blood pressure 112/68, pulse 105, height 157.5 cm (62\"), weight 81.2 kg (179 lb), SpO2 97 %.   98% on 3lpm O2    Physical Exam   Constitutional: She is oriented to person, place, and time. Vital signs are normal. She appears well-developed and well-nourished.   HENT:   Head: Normocephalic and atraumatic.   Nose: Nose normal.   Mouth/Throat: Uvula is midline, oropharynx is clear and moist and mucous membranes are normal.   Mallampati 4   Eyes: Conjunctivae, EOM and lids are normal. Pupils are equal, round, and reactive to light.   Eyeglasses   Neck: Trachea normal and normal range of motion. No tracheal tenderness present. No thyroid mass present.   Cardiovascular: Normal rate, regular rhythm and normal heart sounds. PMI is not displaced. Exam reveals no gallop.   No murmur heard.  Pulmonary/Chest: Effort normal. No respiratory distress. She has no decreased breath sounds. She has no wheezes. She has no rhonchi. She exhibits no tenderness.   Prolonged expiration, congested cough   Abdominal: Soft. Normal appearance and bowel " sounds are normal. There is no hepatomegaly. There is no tenderness.   Musculoskeletal:   In wheelchair, mild BLE edema     Vascular Status -  Her right foot exhibits abnormal foot edema. Her left foot exhibits abnormal foot edema.  Lymphadenopathy:        Head (right side): No submandibular adenopathy present.        Head (left side): No submandibular adenopathy present.     She has no cervical adenopathy.        Right: No supraclavicular adenopathy present.        Left: No supraclavicular adenopathy present.   Neurological: She is alert and oriented to person, place, and time.   Skin: Skin is warm and dry. No rash noted. No cyanosis. Nails show no clubbing.   BLE venous stasis changes   Psychiatric: She has a normal mood and affect. Her speech is normal and behavior is normal. Judgment normal.   Nursing note and vitals reviewed.      PFTs: 1/28/19 (independently reviewed and interpreted by me)  Ratio 45  FVC 2.09/ 86%  FEV1 0.93/ 52%  Moderate obstruction.  No comparative data available.       Assessment/Plan     Gavi was seen today for copd.    Diagnoses and all orders for this visit:    Acute bronchitis, unspecified organism  -     predniSONE (DELTASONE) 20 MG tablet; Take 2 tablets by mouth Daily for 5 days.    Stage 2 moderate COPD by GOLD classification (CMS/HCC)    COPD, group B, by GOLD 2017 classification (CMS/HCC)    Perennial allergic rhinitis    Chronic respiratory failure with hypoxia (CMS/HCC)    Class 1 obesity due to excess calories with serious comorbidity and body mass index (BMI) of 31.0 to 31.9 in adult    Physical deconditioning    Personal history of tobacco use, presenting hazards to health         Discussion/ Recommendations:   Unfortunately, she has persistent symptoms from acute bronchitis which I think is likely due to the need for steroids.  Do not feel additional antibiotics are necessary and have instructed her to complete her course of doxycycline.  I do think she would benefit from  additional steroids so I have sent in a prescription for prednisone.  Otherwise, she should continue on her triple therapy with Trelegy as well as her submental oxygen.    -Prednisone 40 mg daily for 5 days starting tomorrow  -Complete current doxycycline course  -Continue Trelegy daily.  -Use albuterol MDI or neb as needed for dyspnea, wheeze, or chest congestion  -Continue supplemental oxygen 2lpm.  Goal saturation greater than 88%.   -Encouraged regular, progressive exercise.  -Up-to-date with pneumococcal vaccine.  Encouraged patient to get flu vaccine soon.      Patient's Body mass index is 32.74 kg/m². BMI is above normal parameters. Recommendations include: exercise counseling.           Return in about 3 months (around 12/19/2019) for Recheck COPD.      Thank you for allowing me to participate in the care of Gavi Gordon. Please do not hesitate to contact me with any questions.         This document has been electronically signed by Joan Andrews MD on September 19, 2019 4:08 PM      Dictated using Dragon

## 2019-09-19 ENCOUNTER — OFFICE VISIT (OUTPATIENT)
Dept: PULMONOLOGY | Facility: CLINIC | Age: 84
End: 2019-09-19

## 2019-09-19 VITALS
HEART RATE: 105 BPM | BODY MASS INDEX: 32.94 KG/M2 | HEIGHT: 62 IN | WEIGHT: 179 LBS | SYSTOLIC BLOOD PRESSURE: 112 MMHG | DIASTOLIC BLOOD PRESSURE: 68 MMHG | OXYGEN SATURATION: 97 %

## 2019-09-19 DIAGNOSIS — J30.89 PERENNIAL ALLERGIC RHINITIS: ICD-10-CM

## 2019-09-19 DIAGNOSIS — E66.09 CLASS 1 OBESITY DUE TO EXCESS CALORIES WITH SERIOUS COMORBIDITY AND BODY MASS INDEX (BMI) OF 31.0 TO 31.9 IN ADULT: ICD-10-CM

## 2019-09-19 DIAGNOSIS — J44.9 COPD, GROUP B, BY GOLD 2017 CLASSIFICATION (HCC): Chronic | ICD-10-CM

## 2019-09-19 DIAGNOSIS — J44.9 STAGE 2 MODERATE COPD BY GOLD CLASSIFICATION (HCC): Chronic | ICD-10-CM

## 2019-09-19 DIAGNOSIS — Z87.891 PERSONAL HISTORY OF TOBACCO USE, PRESENTING HAZARDS TO HEALTH: ICD-10-CM

## 2019-09-19 DIAGNOSIS — J20.9 ACUTE BRONCHITIS, UNSPECIFIED ORGANISM: Primary | ICD-10-CM

## 2019-09-19 DIAGNOSIS — J96.11 CHRONIC RESPIRATORY FAILURE WITH HYPOXIA (HCC): ICD-10-CM

## 2019-09-19 DIAGNOSIS — R53.81 PHYSICAL DECONDITIONING: ICD-10-CM

## 2019-09-19 PROCEDURE — 99214 OFFICE O/P EST MOD 30 MIN: CPT | Performed by: INTERNAL MEDICINE

## 2019-09-19 RX ORDER — PREDNISONE 20 MG/1
40 TABLET ORAL DAILY
Qty: 10 TABLET | Refills: 0 | Status: SHIPPED | OUTPATIENT
Start: 2019-09-19 | End: 2019-09-24

## 2019-09-24 ENCOUNTER — OFFICE VISIT (OUTPATIENT)
Dept: FAMILY MEDICINE CLINIC | Facility: CLINIC | Age: 84
End: 2019-09-24

## 2019-09-24 VITALS
HEART RATE: 72 BPM | HEIGHT: 62 IN | BODY MASS INDEX: 33 KG/M2 | WEIGHT: 179.3 LBS | SYSTOLIC BLOOD PRESSURE: 116 MMHG | RESPIRATION RATE: 16 BRPM | DIASTOLIC BLOOD PRESSURE: 68 MMHG | TEMPERATURE: 98.3 F

## 2019-09-24 DIAGNOSIS — K21.9 GASTROESOPHAGEAL REFLUX DISEASE WITHOUT ESOPHAGITIS: Primary | ICD-10-CM

## 2019-09-24 DIAGNOSIS — M81.0 AGE-RELATED OSTEOPOROSIS WITHOUT CURRENT PATHOLOGICAL FRACTURE: ICD-10-CM

## 2019-09-24 DIAGNOSIS — E03.9 ACQUIRED HYPOTHYROIDISM: ICD-10-CM

## 2019-09-24 DIAGNOSIS — E78.2 MIXED HYPERLIPIDEMIA: ICD-10-CM

## 2019-09-24 DIAGNOSIS — K52.9 CHRONIC DIARRHEA: ICD-10-CM

## 2019-09-24 DIAGNOSIS — J20.9 ACUTE BRONCHITIS, UNSPECIFIED ORGANISM: ICD-10-CM

## 2019-09-24 DIAGNOSIS — J44.9 MODERATE COPD (CHRONIC OBSTRUCTIVE PULMONARY DISEASE) (HCC): ICD-10-CM

## 2019-09-24 DIAGNOSIS — I47.1 SUPRAVENTRICULAR TACHYCARDIA (HCC): ICD-10-CM

## 2019-09-24 DIAGNOSIS — J30.9 CHRONIC ALLERGIC RHINITIS: ICD-10-CM

## 2019-09-24 DIAGNOSIS — R41.3 MEMORY LOSS: ICD-10-CM

## 2019-09-24 DIAGNOSIS — R41.3 MEMORY IMPAIRMENT: ICD-10-CM

## 2019-09-24 DIAGNOSIS — M15.9 PRIMARY OSTEOARTHRITIS INVOLVING MULTIPLE JOINTS: ICD-10-CM

## 2019-09-24 DIAGNOSIS — F41.1 GENERALIZED ANXIETY DISORDER: ICD-10-CM

## 2019-09-24 DIAGNOSIS — F41.1 GAD (GENERALIZED ANXIETY DISORDER): ICD-10-CM

## 2019-09-24 DIAGNOSIS — J44.9 COPD, GROUP B, BY GOLD 2017 CLASSIFICATION (HCC): ICD-10-CM

## 2019-09-24 DIAGNOSIS — I10 ESSENTIAL HYPERTENSION: ICD-10-CM

## 2019-09-24 DIAGNOSIS — E87.6 HYPOKALEMIA: ICD-10-CM

## 2019-09-24 DIAGNOSIS — J01.20 ACUTE NON-RECURRENT ETHMOIDAL SINUSITIS: ICD-10-CM

## 2019-09-24 PROCEDURE — 99214 OFFICE O/P EST MOD 30 MIN: CPT | Performed by: NURSE PRACTITIONER

## 2019-09-24 RX ORDER — CELECOXIB 200 MG/1
200 CAPSULE ORAL DAILY
Qty: 90 CAPSULE | Refills: 3 | Status: SHIPPED | OUTPATIENT
Start: 2019-09-24

## 2019-09-24 RX ORDER — CLOPIDOGREL BISULFATE 75 MG/1
75 TABLET ORAL DAILY
Qty: 90 TABLET | Refills: 3 | Status: SHIPPED | OUTPATIENT
Start: 2019-09-24

## 2019-09-24 RX ORDER — RANITIDINE 150 MG/1
150 TABLET ORAL 2 TIMES DAILY
Qty: 180 TABLET | Refills: 3 | Status: SHIPPED | OUTPATIENT
Start: 2019-09-24

## 2019-09-24 RX ORDER — LORAZEPAM 0.5 MG/1
0.5 TABLET ORAL EVERY 8 HOURS PRN
Qty: 90 TABLET | Refills: 0 | Status: SHIPPED | OUTPATIENT
Start: 2019-09-24 | End: 2020-04-02 | Stop reason: SDUPTHER

## 2019-09-24 RX ORDER — ALBUTEROL SULFATE 2.5 MG/3ML
2.5 SOLUTION RESPIRATORY (INHALATION) EVERY 4 HOURS PRN
Qty: 360 VIAL | Refills: 3 | Status: SHIPPED | OUTPATIENT
Start: 2019-09-24

## 2019-09-24 RX ORDER — DIGOXIN 125 MCG
125 TABLET ORAL DAILY
Qty: 90 TABLET | Refills: 3 | Status: SHIPPED | OUTPATIENT
Start: 2019-09-24 | End: 2020-02-12

## 2019-09-24 RX ORDER — DIPHENOXYLATE HYDROCHLORIDE AND ATROPINE SULFATE 2.5; .025 MG/1; MG/1
TABLET ORAL
Qty: 180 TABLET | Refills: 1 | Status: SHIPPED | OUTPATIENT
Start: 2019-09-24 | End: 2020-03-23 | Stop reason: SDUPTHER

## 2019-09-24 RX ORDER — VENLAFAXINE HYDROCHLORIDE 75 MG/1
150 CAPSULE, EXTENDED RELEASE ORAL DAILY
Qty: 90 CAPSULE | Refills: 3 | Status: SHIPPED | OUTPATIENT
Start: 2019-09-24 | End: 2020-06-15

## 2019-09-24 RX ORDER — ALENDRONATE SODIUM 70 MG/1
70 TABLET ORAL
Qty: 12 TABLET | Refills: 3 | Status: SHIPPED | OUTPATIENT
Start: 2019-09-24

## 2019-09-24 RX ORDER — ISOSORBIDE MONONITRATE 60 MG/1
60 TABLET, EXTENDED RELEASE ORAL DAILY
Qty: 90 TABLET | Refills: 3 | Status: SHIPPED | OUTPATIENT
Start: 2019-09-24

## 2019-09-24 RX ORDER — FUROSEMIDE 40 MG/1
40 TABLET ORAL 2 TIMES DAILY
Qty: 180 TABLET | Refills: 3 | Status: SHIPPED | OUTPATIENT
Start: 2019-09-24

## 2019-09-24 RX ORDER — MONTELUKAST SODIUM 10 MG/1
10 TABLET ORAL NIGHTLY
Qty: 90 TABLET | Refills: 3 | Status: SHIPPED | OUTPATIENT
Start: 2019-09-24

## 2019-09-24 RX ORDER — LEVOTHYROXINE SODIUM 0.05 MG/1
50 TABLET ORAL DAILY
Qty: 90 TABLET | Refills: 3 | Status: SHIPPED | OUTPATIENT
Start: 2019-09-24

## 2019-09-24 RX ORDER — OMEPRAZOLE 20 MG/1
20 CAPSULE, DELAYED RELEASE ORAL 2 TIMES DAILY
Qty: 180 CAPSULE | Refills: 3 | Status: SHIPPED | OUTPATIENT
Start: 2019-09-24

## 2019-09-24 RX ORDER — MEMANTINE HYDROCHLORIDE 28 MG/1
28 CAPSULE, EXTENDED RELEASE ORAL DAILY
Qty: 90 CAPSULE | Refills: 3 | Status: SHIPPED | OUTPATIENT
Start: 2019-09-24

## 2019-09-24 RX ORDER — ATORVASTATIN CALCIUM 80 MG/1
80 TABLET, FILM COATED ORAL NIGHTLY
Qty: 90 TABLET | Refills: 3 | Status: SHIPPED | OUTPATIENT
Start: 2019-09-24

## 2019-09-24 RX ORDER — FLUTICASONE PROPIONATE 50 MCG
2 SPRAY, SUSPENSION (ML) NASAL DAILY
Qty: 3 BOTTLE | Refills: 3 | Status: SHIPPED | OUTPATIENT
Start: 2019-09-24

## 2019-09-24 RX ORDER — SPIRONOLACTONE 50 MG/1
50 TABLET, FILM COATED ORAL DAILY
Qty: 90 TABLET | Refills: 3 | Status: SHIPPED | OUTPATIENT
Start: 2019-09-24

## 2019-09-24 RX ORDER — POTASSIUM CHLORIDE 750 MG/1
10 TABLET, FILM COATED, EXTENDED RELEASE ORAL DAILY
Qty: 90 TABLET | Refills: 3 | Status: SHIPPED | OUTPATIENT
Start: 2019-09-24

## 2019-09-24 RX ORDER — DONEPEZIL HYDROCHLORIDE 10 MG/1
10 TABLET, FILM COATED ORAL NIGHTLY
Qty: 90 TABLET | Refills: 3 | Status: SHIPPED | OUTPATIENT
Start: 2019-09-24

## 2019-09-24 RX ORDER — CARVEDILOL 3.12 MG/1
3.12 TABLET ORAL 2 TIMES DAILY WITH MEALS
Qty: 180 TABLET | Refills: 3 | Status: SHIPPED | OUTPATIENT
Start: 2019-09-24

## 2019-09-24 RX ORDER — BUSPIRONE HYDROCHLORIDE 10 MG/1
10 TABLET ORAL 2 TIMES DAILY PRN
Qty: 180 TABLET | Refills: 3 | Status: SHIPPED | OUTPATIENT
Start: 2019-09-24

## 2019-09-24 NOTE — PROGRESS NOTES
Chief Complaint   Patient presents with   • [facility]     Veronica pt     Subjective   Gavi Gordon is a 85 y.o. female who presents to the office for routine follow up of chronic anxiety requiring benzodiazepines, brief review of chronic conditions and needs refills on all meds. No new complaints.    The following portions of the patient's history were reviewed and updated as appropriate: allergies, current medications, past family history, past medical history, past social history, past surgical history and problem list.    History of Present Illness     UDS 6/24/19    Fasting labs: 3/13/19    PMH: chronic pain multiple joints r/t Primary OA; COPD, depression, anxiety, age related memory impairment, hyperlipidemia, IBS, hypothyroidism,     Anxiety: chronic on set years ago, managed with buspirone and lorazepam, requires refills today.   COPD on portable O2 during exam, no distress. Requests refills of inhalers today/ nebs      Past Medical History:   Diagnosis Date   • Abnormality of nail of toe    • Acute bronchitis, unspecified    • Age-asscioated Memory impairment    • Allergic rhinitis    • Anxiety state    • Backache    • Body mass index (BMI) of 30+ - obesity    • Chronic dermatitis    • Chronic obstructive lung disease (CMS/HCC)    • Chronic respiratory failure with hypoxia (CMS/HCC)    • Cough    • Depressive disorder    • Dermatitis    • Diarrhea    • Edema of lower extremity    • Gastritis    • Generalized anxiety disorder    • Hyperbilirubinemia    • Hyperlipidemia    • Hypothyroidism    • IBS (irritable bowel syndrome)    • Intertrigo    • Memory impairment    • Multiple acquired skin tags    • Need for prophylactic vaccination and inoculation against diphtheria-tetanus-pertussis, combined [DTP    • Needs Influenza immunization    • Osteoarthritis    • Osteopenia    • Other lesions of oral mucosa    • Pain in lower limb    • Seborrheic dermatitis, unspecified    • Seborrheic keratoses    •  "Shoulder joint pain    • Synovial cyst popliteal space    • Tinea unguium    • Xerosis cutis           Family History   Problem Relation Age of Onset   • Depression Mother    • Diabetes Sister    • Osteoarthritis Sister    • Osteoporosis Sister    • Depression Brother    • Diabetes Brother    • Heart disease Brother    • Hypertension Brother    • Other Brother         smoking tobacco   • Cancer Brother         stomach   • Tuberculosis Other         Review of Systems   Constitutional: Negative.  Negative for appetite change, chills, fatigue, fever and unexpected weight change.   HENT: Negative.  Negative for congestion, ear pain, rhinorrhea and sore throat.    Eyes: Negative.  Negative for pain.   Respiratory: Positive for cough, shortness of breath and wheezing. Negative for chest tightness.    Cardiovascular: Negative.  Negative for chest pain and palpitations.   Gastrointestinal: Negative.  Negative for abdominal pain, constipation, diarrhea and nausea.   Endocrine: Negative.    Genitourinary: Negative.  Negative for dysuria.   Musculoskeletal: Negative.  Negative for back pain, joint swelling and neck pain.   Skin: Negative.  Negative for color change, pallor, rash and wound.   Allergic/Immunologic: Negative.    Neurological: Negative.  Negative for dizziness and headaches.   Hematological: Negative.    Psychiatric/Behavioral: Negative.  Negative for sleep disturbance and suicidal ideas.   All other systems reviewed and are negative.      Objective   Vitals:    09/24/19 1313   BP: 116/68   BP Location: Left arm   Patient Position: Sitting   Cuff Size: Adult   Pulse: 72   Resp: 16   Temp: 98.3 °F (36.8 °C)   TempSrc: Tympanic   Weight: 81.3 kg (179 lb 4.8 oz)   Height: 157.5 cm (62\")   PainSc: 0-No pain     Physical Exam   Constitutional: She is oriented to person, place, and time. She appears well-developed and well-nourished.   HENT:   Head: Normocephalic and atraumatic.   Eyes: Conjunctivae are normal. Pupils " are equal, round, and reactive to light.   Neck: Normal range of motion. Neck supple.   Cardiovascular: Normal rate, regular rhythm, normal heart sounds and intact distal pulses. Exam reveals no gallop and no friction rub.   No murmur heard.  Pulmonary/Chest: Effort normal and breath sounds normal. No respiratory distress. She has no wheezes. She has no rales. She exhibits no tenderness.   Abdominal: Soft. Bowel sounds are normal.   Musculoskeletal: Normal range of motion. She exhibits no edema, tenderness or deformity.   Lymphadenopathy:     She has no cervical adenopathy.   Neurological: She is alert and oriented to person, place, and time.   Skin: Skin is warm and dry. Capillary refill takes 2 to 3 seconds. No erythema. No pallor.   Psychiatric: She has a normal mood and affect. Her behavior is normal. Judgment and thought content normal.   Nursing note and vitals reviewed.      Assessment/Plan   Gavi was seen today for establish care.    Diagnoses and all orders for this visit:    Gastroesophageal reflux disease without esophagitis  -     raNITIdine (ZANTAC) 150 MG tablet; Take 1 tablet by mouth 2 (Two) Times a Day.  -     omeprazole (priLOSEC) 20 MG capsule; Take 1 capsule by mouth 2 (Two) Times a Day.    Acquired hypothyroidism  -     levothyroxine (SYNTHROID) 50 MCG tablet; Take 1 tablet by mouth Daily.    Acute bronchitis, unspecified organism    Memory impairment  -     memantine (NAMENDA XR) 28 MG capsule sustained-release 24 hr extended release capsule; Take 1 capsule by mouth Daily.    Generalized anxiety disorder  -     LORazepam (ATIVAN) 0.5 MG tablet; Take 1 tablet by mouth Every 8 (Eight) Hours As Needed for Anxiety.    Essential hypertension  -     spironolactone (ALDACTONE) 50 MG tablet; Take 1 tablet by mouth Daily.  -     isosorbide mononitrate (IMDUR) 60 MG 24 hr tablet; Take 1 tablet by mouth Daily.  -     furosemide (LASIX) 40 MG tablet; Take 1 tablet by mouth 2 (Two) Times a Day.  -      carvedilol (COREG) 3.125 MG tablet; Take 1 tablet by mouth 2 (Two) Times a Day With Meals.    Moderate COPD (chronic obstructive pulmonary disease) (CMS/[facility])  -     Fluticasone-Umeclidin-Vilant 100-62.5-25 MCG/INH aerosol powder ; Inhale 1 each Daily.  -     albuterol (PROVENTIL) (2.5 MG/3ML) 0.083% nebulizer solution; Take 2.5 mg by nebulization Every 4 (Four) Hours As Needed for Wheezing or Shortness of Air.  -     albuterol (PROAIR RESPICLICK) 108 (90 Base) MCG/ACT inhaler; Inhale 1 puff Every 4 (Four) Hours As Needed for Wheezing.    Acute non-recurrent ethmoidal sinusitis  -     fluticasone (FLONASE) 50 MCG/ACT nasal spray; 2 sprays into the nostril(s) as directed by provider Daily.    Chronic diarrhea  -     diphenoxylate-atropine (LOMOTIL) 2.5-0.025 MG per tablet; 1 TABLET BY MOUTH 2 TIMES A DAY    Supraventricular tachycardia (CMS/[facility])  -     digoxin (LANOXIN) 125 MCG tablet; Take 1 tablet by mouth Daily.    Primary osteoarthritis involving multiple joints  -     celecoxib (CeleBREX) 200 MG capsule; Take 1 capsule by mouth Daily.    VARSHA (generalized anxiety disorder)  -     venlafaxine XR (EFFEXOR-XR) 75 MG 24 hr capsule; Take 2 capsules by mouth Daily.  -     busPIRone (BUSPAR) 10 MG tablet; Take 1 tablet by mouth 2 (Two) Times a Day As Needed (anxiety).    COPD, group B, by GOLD 2017 classification (CMS/[facility])  -     albuterol (PROVENTIL) (2.5 MG/3ML) 0.083% nebulizer solution; Take 2.5 mg by nebulization Every 4 (Four) Hours As Needed for Wheezing or Shortness of Air.  -     albuterol (PROAIR RESPICLICK) 108 (90 Base) MCG/ACT inhaler; Inhale 1 puff Every 4 (Four) Hours As Needed for Wheezing.    Chronic allergic rhinitis  -     montelukast (SINGULAIR) 10 MG tablet; Take 1 tablet by mouth Every Night.  -     fluticasone (FLONASE) 50 MCG/ACT nasal spray; 2 sprays into the nostril(s) as directed by provider Daily.    Hypokalemia  -     potassium chloride (KLOR-CON) 10 MEQ CR tablet; Take 1 tablet by mouth  Daily.    Memory loss  -     donepezil (ARICEPT) 10 MG tablet; Take 1 tablet by mouth Every Night.    Mixed hyperlipidemia  -     atorvastatin (LIPITOR) 80 MG tablet; Take 1 tablet by mouth Every Night.    Age-related osteoporosis without current pathological fracture  -     diphenoxylate-atropine (LOMOTIL) 2.5-0.025 MG per tablet; 1 TABLET BY MOUTH 2 TIMES A DAY  -     calcium carb-cholecalciferol 600-800 MG-UNIT tablet; Take 1 tablet by mouth 2 (Two) Times a Day With Meals.  -     alendronate (FOSAMAX) 70 MG tablet; Take 1 tablet by mouth Every 7 (Seven) Days.    Other orders  -     clopidogrel (PLAVIX) 75 MG tablet; Take 1 tablet by mouth Daily.           PHQ-2/PHQ-9 Depression Screening 9/24/2019   Little interest or pleasure in doing things 0   Feeling down, depressed, or hopeless 0   Trouble falling or staying asleep, or sleeping too much 0   Feeling tired or having little energy 0   Poor appetite or overeating 0   Feeling bad about yourself - or that you are a failure or have let yourself or your family down 0   Trouble concentrating on things, such as reading the newspaper or watching television 0   Moving or speaking so slowly that other people could have noticed. Or the opposite - being so fidgety or restless that you have been moving around a lot more than usual 0   Thoughts that you would be better off dead, or of hurting yourself in some way 0   Total Score 0   If you checked off any problems, how difficult have these problems made it for you to do your work, take care of things at home, or get along with other people? -   Patient understands the risks associated with this controlled medication, including tolerance and addiction.  Patient also agrees to only obtain this medication from me, and not from a another provider, unless that provider is covering for me in my absence.  Patient also agrees to be compliant in dosing, and not self adjust the dose of medication.  A signed controlled substance  agreement is on file, and the patient has received a controlled substance education sheet at this a previous visit.  The patient has also signed a consent for treatment with a controlled substance as per [facility] policy. BEVERLY was obtained.      SULEMAN Pond         Return in about 3 months (around 12/24/2019).    There are no Patient Instructions on file for this visit.

## 2019-10-09 ENCOUNTER — CLINICAL SUPPORT (OUTPATIENT)
Dept: FAMILY MEDICINE CLINIC | Facility: CLINIC | Age: 84
End: 2019-10-09

## 2019-10-09 DIAGNOSIS — Z23 FLU VACCINE NEED: Primary | ICD-10-CM

## 2019-10-09 PROCEDURE — G0008 ADMIN INFLUENZA VIRUS VAC: HCPCS | Performed by: INTERNAL MEDICINE

## 2019-10-09 PROCEDURE — 90653 IIV ADJUVANT VACCINE IM: CPT | Performed by: INTERNAL MEDICINE

## 2020-02-11 DIAGNOSIS — I47.1 SUPRAVENTRICULAR TACHYCARDIA (HCC): ICD-10-CM

## 2020-02-12 DIAGNOSIS — K52.9 CHRONIC DIARRHEA: ICD-10-CM

## 2020-02-12 DIAGNOSIS — M81.0 AGE-RELATED OSTEOPOROSIS WITHOUT CURRENT PATHOLOGICAL FRACTURE: ICD-10-CM

## 2020-02-12 RX ORDER — DIPHENOXYLATE HYDROCHLORIDE AND ATROPINE SULFATE 2.5; .025 MG/1; MG/1
TABLET ORAL
Qty: 180 TABLET | Refills: 0 | OUTPATIENT
Start: 2020-02-12

## 2020-02-12 RX ORDER — DIGOXIN 125 MCG
TABLET ORAL
Qty: 90 TABLET | Refills: 0 | Status: SHIPPED | OUTPATIENT
Start: 2020-02-12 | End: 2020-06-15

## 2020-03-23 DIAGNOSIS — M81.0 AGE-RELATED OSTEOPOROSIS WITHOUT CURRENT PATHOLOGICAL FRACTURE: ICD-10-CM

## 2020-03-23 DIAGNOSIS — K52.9 CHRONIC DIARRHEA: ICD-10-CM

## 2020-03-23 RX ORDER — DIPHENOXYLATE HYDROCHLORIDE AND ATROPINE SULFATE 2.5; .025 MG/1; MG/1
TABLET ORAL
Qty: 180 TABLET | Refills: 1 | Status: SHIPPED | OUTPATIENT
Start: 2020-03-23 | End: 2020-03-24 | Stop reason: SDUPTHER

## 2020-03-24 DIAGNOSIS — K52.9 CHRONIC DIARRHEA: ICD-10-CM

## 2020-03-24 DIAGNOSIS — M81.0 AGE-RELATED OSTEOPOROSIS WITHOUT CURRENT PATHOLOGICAL FRACTURE: ICD-10-CM

## 2020-03-24 DIAGNOSIS — F41.1 GENERALIZED ANXIETY DISORDER: ICD-10-CM

## 2020-03-24 RX ORDER — DIPHENOXYLATE HYDROCHLORIDE AND ATROPINE SULFATE 2.5; .025 MG/1; MG/1
TABLET ORAL
Qty: 180 TABLET | Refills: 1 | Status: SHIPPED | OUTPATIENT
Start: 2020-03-24

## 2020-03-24 RX ORDER — LORAZEPAM 0.5 MG/1
0.5 TABLET ORAL EVERY 8 HOURS PRN
Qty: 90 TABLET | Refills: 0 | OUTPATIENT
Start: 2020-03-24

## 2020-03-30 DIAGNOSIS — Z51.81 ENCOUNTER FOR THERAPEUTIC DRUG LEVEL MONITORING: Primary | ICD-10-CM

## 2020-04-02 DIAGNOSIS — F41.1 GENERALIZED ANXIETY DISORDER: ICD-10-CM

## 2020-04-03 RX ORDER — LORAZEPAM 0.5 MG/1
0.5 TABLET ORAL EVERY 8 HOURS PRN
Qty: 30 TABLET | Refills: 0 | Status: SHIPPED | OUTPATIENT
Start: 2020-04-03

## 2020-06-15 DIAGNOSIS — I47.1 SUPRAVENTRICULAR TACHYCARDIA (HCC): ICD-10-CM

## 2020-06-15 DIAGNOSIS — F41.1 GAD (GENERALIZED ANXIETY DISORDER): ICD-10-CM

## 2020-06-15 RX ORDER — DIGOXIN 125 MCG
TABLET ORAL
Qty: 90 TABLET | Refills: 3 | Status: SHIPPED | OUTPATIENT
Start: 2020-06-15

## 2020-06-15 RX ORDER — VENLAFAXINE HYDROCHLORIDE 75 MG/1
CAPSULE, EXTENDED RELEASE ORAL
Qty: 90 CAPSULE | Refills: 7 | Status: SHIPPED | OUTPATIENT
Start: 2020-06-15

## 2020-10-19 ENCOUNTER — TELEPHONE (OUTPATIENT)
Dept: FAMILY MEDICINE CLINIC | Facility: CLINIC | Age: 85
End: 2020-10-19

## 2020-10-27 NOTE — TELEPHONE ENCOUNTER
She doesn't need this anymore someone at the nursing home is going to do this for her, her care giver said since they have been the ones taking care of her